# Patient Record
Sex: FEMALE | Race: WHITE | Employment: UNEMPLOYED | URBAN - METROPOLITAN AREA
[De-identification: names, ages, dates, MRNs, and addresses within clinical notes are randomized per-mention and may not be internally consistent; named-entity substitution may affect disease eponyms.]

---
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
## 2024-02-05 ENCOUNTER — HOSPITAL ENCOUNTER (EMERGENCY)
Age: 19
Discharge: HOME | End: 2024-02-05
Attending: EMERGENCY MEDICINE
Payer: COMMERCIAL

## 2024-02-05 ENCOUNTER — ANCILLARY PROCEDURE (OUTPATIENT)
Dept: EMERGENCY DEPT | Age: 19
End: 2024-02-05
Attending: EMERGENCY MEDICINE
Payer: COMMERCIAL

## 2024-02-05 VITALS
SYSTOLIC BLOOD PRESSURE: 117 MMHG | HEIGHT: 62 IN | RESPIRATION RATE: 16 BRPM | TEMPERATURE: 98.1 F | DIASTOLIC BLOOD PRESSURE: 64 MMHG | BODY MASS INDEX: 19.14 KG/M2 | WEIGHT: 104 LBS | OXYGEN SATURATION: 98 % | HEART RATE: 82 BPM

## 2024-02-05 DIAGNOSIS — O20.0 THREATENED MISCARRIAGE: Primary | ICD-10-CM

## 2024-02-05 LAB
ANION GAP SERPL CALCULATED.3IONS-SCNC: 10 MMOL/L (ref 9–17)
B-HCG SERPL EIA 3RD IS-ACNC: 1809 MIU/ML
BASOPHILS # BLD: <0.03 K/UL (ref 0–0.2)
BASOPHILS NFR BLD: 0 % (ref 0–2)
BUN SERPL-MCNC: 9 MG/DL (ref 6–20)
BUN/CREAT SERPL: 18 (ref 9–20)
CALCIUM SERPL-MCNC: 8.9 MG/DL (ref 8.6–10.4)
CHLORIDE SERPL-SCNC: 106 MMOL/L (ref 98–107)
CO2 SERPL-SCNC: 21 MMOL/L (ref 20–31)
CREAT SERPL-MCNC: 0.5 MG/DL (ref 0.5–0.9)
EOSINOPHIL # BLD: 0.06 K/UL (ref 0–0.44)
EOSINOPHILS RELATIVE PERCENT: 1 % (ref 1–4)
ERYTHROCYTE [DISTWIDTH] IN BLOOD BY AUTOMATED COUNT: 12.7 % (ref 11.8–14.4)
GFR SERPL CREATININE-BSD FRML MDRD: >60 ML/MIN/1.73M2
GLUCOSE SERPL-MCNC: 86 MG/DL (ref 70–99)
HCT VFR BLD AUTO: 35.9 % (ref 36.3–47.1)
HGB BLD-MCNC: 11.9 G/DL (ref 11.9–15.1)
IMM GRANULOCYTES # BLD AUTO: 0.01 K/UL (ref 0–0.3)
IMM GRANULOCYTES NFR BLD: 0 %
LYMPHOCYTES NFR BLD: 1.39 K/UL (ref 1.2–5.2)
LYMPHOCYTES RELATIVE PERCENT: 30 % (ref 25–45)
MCH RBC QN AUTO: 30.1 PG (ref 25.2–33.5)
MCHC RBC AUTO-ENTMCNC: 33.1 G/DL (ref 28.4–34.8)
MCV RBC AUTO: 90.9 FL (ref 82.6–102.9)
MONOCYTES NFR BLD: 0.36 K/UL (ref 0.1–1.4)
MONOCYTES NFR BLD: 8 % (ref 2–8)
NEUTROPHILS NFR BLD: 61 % (ref 34–64)
NEUTS SEG NFR BLD: 2.77 K/UL (ref 1.8–8)
NRBC BLD-RTO: 0 PER 100 WBC
PLATELET # BLD AUTO: 242 K/UL (ref 138–453)
PMV BLD AUTO: 9.5 FL (ref 8.1–13.5)
POTASSIUM SERPL-SCNC: 3.7 MMOL/L (ref 3.7–5.3)
RBC # BLD AUTO: 3.95 M/UL (ref 3.95–5.11)
SODIUM SERPL-SCNC: 137 MMOL/L (ref 135–144)
WBC OTHER # BLD: 4.6 K/UL (ref 4.5–13.5)

## 2024-02-05 PROCEDURE — 80048 BASIC METABOLIC PNL TOTAL CA: CPT

## 2024-02-05 PROCEDURE — 85025 COMPLETE CBC W/AUTO DIFF WBC: CPT

## 2024-02-05 PROCEDURE — 99283 EMERGENCY DEPT VISIT LOW MDM: CPT

## 2024-02-05 PROCEDURE — 76705 ECHO EXAM OF ABDOMEN: CPT

## 2024-02-05 PROCEDURE — 84702 CHORIONIC GONADOTROPIN TEST: CPT

## 2024-02-05 NOTE — DISCHARGE INSTRUCTIONS
Given your hCG levels are going down this is likely indicating that you are going to miscarry.  Increased bleeding and cramping are expected.  You can take Motrin and or Tylenol to help with discomfort.  You may return the emergency room at any time if you feel necessary.  If you have heavy bleeding where you are saturating a pad or tampon every hour for more than 3 hours I do recommend return to the ER.

## 2024-02-05 NOTE — ED PROVIDER NOTES
EMERGENCY DEPARTMENT ENCOUNTER    Pt Name: Jaki Waters  MRN: [id number]  Birthdate 2005  Date of evaluation: 2/5/24  CHIEF COMPLAINT       Chief Complaint   Patient presents with    Vaginal Bleeding     HISTORY OF PRESENT ILLNESS   18-year-old female presents emergency room with vaginal bleeding.  Patient is currently 6 weeks pregnant.  Patient had transvaginal ultrasound at [facility] on 2/2/2024.  She had a little bit of brown spotting then has had increased bleeding since.             REVIEW OF SYSTEMS     Review of Systems  PASTMEDICAL HISTORY   No past medical history on file.  Past Problem List  Patient Active Problem List   Diagnosis Code    Abscess L02.91    Neck abscess L02.11    Scoliosis of thoracolumbar spine M41.9    Tobacco abuse Z72.0    High anion gap metabolic acidosis E87.29    Anorexia R63.0    Atypical chest pain R07.89     SURGICAL HISTORY       Past Surgical History:   Procedure Laterality Date    INCISION AND DRAINAGE ANTERIOR NECK Left 10/05/2022    NECK INCISION AND DRAINAGE- LEFT    NECK SURGERY Left 10/5/2022    NECK INCISION AND DRAINAGE- LEFT performed by Reed Weir MD at [facility] OR     CURRENT MEDICATIONS       Previous Medications    No medications on file     ALLERGIES     has No Known Allergies.  FAMILY HISTORY     She indicated that the status of her mother is unknown. She indicated that the status of her father is unknown.     SOCIAL HISTORY       Social History     Tobacco Use    Smoking status: Never    Smokeless tobacco: Never   Vaping Use    Vaping Use: Every day    Substances: Nicotine, CBD, Flavoring   Substance Use Topics    Alcohol use: Yes     Alcohol/week: 2.0 standard drinks of alcohol     Types: 2 Drinks containing 0.5 oz of alcohol per week    Drug use: Never     PHYSICAL EXAM     INITIAL VITALS: /64   Pulse 82   Temp 98.1 °F (36.7 °C) (Oral)   Resp 16   Ht 1.575 m (5' 2\")   Wt 47.2 kg (104 lb)   LMP 12/22/2023   SpO2 98%   BMI 19.02

## 2024-04-15 ENCOUNTER — HOSPITAL ENCOUNTER (EMERGENCY)
Age: 19
Discharge: HOME | End: 2024-04-15
Attending: EMERGENCY MEDICINE
Payer: COMMERCIAL

## 2024-04-15 VITALS
WEIGHT: 110 LBS | OXYGEN SATURATION: 98 % | BODY MASS INDEX: 20.24 KG/M2 | TEMPERATURE: 98.3 F | HEART RATE: 65 BPM | DIASTOLIC BLOOD PRESSURE: 64 MMHG | SYSTOLIC BLOOD PRESSURE: 98 MMHG | RESPIRATION RATE: 20 BRPM | HEIGHT: 62 IN

## 2024-04-15 DIAGNOSIS — R30.0 DYSURIA: Primary | ICD-10-CM

## 2024-04-15 LAB
BILIRUB UR QL STRIP: NEGATIVE
CLARITY UR: CLEAR
COLOR UR: YELLOW
EPI CELLS #/AREA URNS HPF: NORMAL /HPF (ref 0–5)
GLUCOSE UR STRIP-MCNC: NEGATIVE MG/DL
HCG UR QL: NEGATIVE
HGB UR QL STRIP.AUTO: NEGATIVE
KETONES UR STRIP-MCNC: NEGATIVE MG/DL
LEUKOCYTE ESTERASE UR QL STRIP: ABNORMAL
NITRITE UR QL STRIP: NEGATIVE
PH UR STRIP: 7 [PH] (ref 5–8)
PROT UR STRIP-MCNC: NEGATIVE MG/DL
RBC #/AREA URNS HPF: NORMAL /HPF (ref 0–2)
SP GR UR STRIP: <1.005 (ref 1–1.03)
UROBILINOGEN UR STRIP-ACNC: NORMAL EU/DL (ref 0–1)
WBC #/AREA URNS HPF: NORMAL /HPF (ref 0–5)

## 2024-04-15 PROCEDURE — 81001 URINALYSIS AUTO W/SCOPE: CPT

## 2024-04-15 PROCEDURE — 87491 CHLMYD TRACH DNA AMP PROBE: CPT

## 2024-04-15 PROCEDURE — 81025 URINE PREGNANCY TEST: CPT

## 2024-04-15 PROCEDURE — 99283 EMERGENCY DEPT VISIT LOW MDM: CPT

## 2024-04-15 PROCEDURE — 87591 N.GONORRHOEAE DNA AMP PROB: CPT

## 2024-04-15 ASSESSMENT — ENCOUNTER SYMPTOMS
EYE ITCHING: 0
NAUSEA: 0
EYE DISCHARGE: 0
COLOR CHANGE: 0
BACK PAIN: 0
VOMITING: 0
EYE PAIN: 0
WHEEZING: 0
RHINORRHEA: 0
SORE THROAT: 0
COUGH: 0

## 2024-04-15 ASSESSMENT — PAIN - FUNCTIONAL ASSESSMENT: PAIN_FUNCTIONAL_ASSESSMENT: NONE - DENIES PAIN

## 2024-04-15 NOTE — ED NOTES
Pt provided discharge instructions and told that she will be called with abnormal urine findings.  Pt instructed to follow up with PCP and told to return to ED with new / worsened symptoms.  Pt verbalizes understanding and denies additional questions or concerns at this time.  Pt ambulatory out of ED with steady gait

## 2024-04-15 NOTE — DISCHARGE INSTRUCTIONS
The pharmacist or ED staff will call if your culture is positive    Please return to the emergency department for worsening symptoms or other emergent concerns

## 2024-04-15 NOTE — ED NOTES
Pt to ED with concern for UTI.  Pt reports increased urinary frequency, urgency, and burning with urination for the past couple of days.  Pt denies abdominal pain, nausea, vomiting, fever, or chills.  Pt denies hematuria

## 2024-04-16 LAB
CHLAMYDIA DNA UR QL NAA+PROBE: NEGATIVE
N GONORRHOEA DNA UR QL NAA+PROBE: NEGATIVE
SPECIMEN DESCRIPTION: NORMAL

## 2024-04-16 NOTE — ED PROVIDER NOTES
eMERGENCY dEPARTMENT eNCOUnter   Independent Attestation     Pt Name: Jaki Waters  MRN: [id number]  Birthdate 2005  Date of evaluation: 4/16/24     Jaki Waters is a 18 y.o. female with CC: Urinary Frequency (Onset yesterday)      Based on the medical record the care appears appropriate.  I was personally available for consultation in the Emergency Department.    Sally Arrington MD  Attending Emergency Physician                  Sally Arrington MD  04/16/24 0711    
MD Carm[facility] at [facility] OR     CURRENT MEDICATIONS       There are no discharge medications for this patient.    ALLERGIES     has No Known Allergies.  FAMILY HISTORY     She indicated that the status of her mother is unknown. She indicated that the status of her father is unknown.     SOCIAL HISTORY       Social History     Tobacco Use    Smoking status: Never    Smokeless tobacco: Never   Vaping Use    Vaping Use: Every day    Substances: Nicotine, CBD, Flavoring   Substance Use Topics    Alcohol use: Yes     Alcohol/week: 2.0 standard drinks of alcohol     Types: 2 Drinks containing 0.5 oz of alcohol per week    Drug use: Never     PHYSICAL EXAM     INITIAL VITALS: BP 98/64   Pulse 65   Temp 98.3 °F (36.8 °C) (Oral)   Resp 20   Ht 1.575 m (5' 2\")   Wt 49.9 kg (110 lb)   LMP 04/01/2024   SpO2 98%   BMI 20.12 kg/m²    Physical Exam  Constitutional:       Appearance: She is well-developed. She is not diaphoretic.   HENT:      Head: Normocephalic and atraumatic.      Right Ear: External ear normal.      Left Ear: External ear normal.   Eyes:      General: No scleral icterus.        Right eye: No discharge.         Left eye: No discharge.   Neck:      Trachea: No tracheal deviation.   Cardiovascular:      Rate and Rhythm: Normal rate and regular rhythm.      Heart sounds: Normal heart sounds. No murmur heard.     No gallop.   Pulmonary:      Effort: Pulmonary effort is normal. No respiratory distress.      Breath sounds: Normal breath sounds. No stridor.   Abdominal:      Tenderness: There is no abdominal tenderness. There is no guarding or rebound.   Musculoskeletal:         General: Normal range of motion.      Cervical back: Normal range of motion.   Skin:     General: Skin is warm and dry.      Coloration: Skin is not pale.      Findings: No rash (on exposed surfaces).   Neurological:      Mental Status: She is alert and oriented to person, place, and time.      Coordination: Coordination normal.

## 2024-06-10 ENCOUNTER — OFFICE VISIT (OUTPATIENT)
Dept: OBGYN CLINIC | Age: 19
End: 2024-06-10
Payer: COMMERCIAL

## 2024-06-10 ENCOUNTER — HOSPITAL ENCOUNTER (OUTPATIENT)
Age: 19
Setting detail: SPECIMEN
Discharge: HOME | End: 2024-06-10

## 2024-06-10 VITALS
WEIGHT: 105 LBS | DIASTOLIC BLOOD PRESSURE: 75 MMHG | BODY MASS INDEX: 19.32 KG/M2 | HEIGHT: 62 IN | SYSTOLIC BLOOD PRESSURE: 110 MMHG

## 2024-06-10 DIAGNOSIS — Z32.01 POSITIVE PREGNANCY TEST: Primary | ICD-10-CM

## 2024-06-10 DIAGNOSIS — Z3A.10 10 WEEKS GESTATION OF PREGNANCY: ICD-10-CM

## 2024-06-10 DIAGNOSIS — O21.9 NAUSEA AND VOMITING DURING PREGNANCY PRIOR TO 22 WEEKS GESTATION: ICD-10-CM

## 2024-06-10 DIAGNOSIS — Z32.01 POSITIVE PREGNANCY TEST: ICD-10-CM

## 2024-06-10 LAB
ABO + RH BLD: NORMAL
BASOPHILS # BLD: <0.03 K/UL (ref 0–0.2)
BASOPHILS NFR BLD: 0 % (ref 0–2)
BLOOD GROUP ANTIBODIES SERPL: NEGATIVE
EOSINOPHIL # BLD: 0.04 K/UL (ref 0–0.44)
ERYTHROCYTE [DISTWIDTH] IN BLOOD BY AUTOMATED COUNT: 12.8 % (ref 11.8–14.4)
HBV SURFACE AG SERPL QL IA: NONREACTIVE
HCT VFR BLD AUTO: 39.5 % (ref 36.3–47.1)
HCV AB SERPL QL IA: NONREACTIVE
HGB BLD-MCNC: 13.2 G/DL (ref 11.9–15.1)
HIV 1+2 AB+HIV1 P24 AG SERPL QL IA: NONREACTIVE
IMM GRANULOCYTES # BLD AUTO: <0.03 K/UL (ref 0–0.3)
IMM GRANULOCYTES NFR BLD: 0 %
LYMPHOCYTES NFR BLD: 1.15 K/UL (ref 1.2–5.2)
LYMPHOCYTES RELATIVE PERCENT: 17 % (ref 25–45)
MCH RBC QN AUTO: 30.8 PG (ref 25.2–33.5)
MCHC RBC AUTO-ENTMCNC: 33.4 G/DL (ref 28.4–34.8)
MCV RBC AUTO: 92.1 FL (ref 82.6–102.9)
MONOCYTES NFR BLD: 0.34 K/UL (ref 0.1–1.4)
MONOCYTES NFR BLD: 5 % (ref 2–8)
NEUTROPHILS NFR BLD: 77 % (ref 34–64)
NEUTS SEG NFR BLD: 5.23 K/UL (ref 1.8–8)
NRBC BLD-RTO: 0 PER 100 WBC
PLATELET # BLD AUTO: 278 K/UL (ref 138–453)
PMV BLD AUTO: 10.6 FL (ref 8.1–13.5)
RBC # BLD AUTO: 4.29 M/UL (ref 3.95–5.11)
RUBV IGG SERPL QL IA: 286 IU/ML
T PALLIDUM AB SER QL IA: NONREACTIVE
WBC OTHER # BLD: 6.8 K/UL (ref 4.5–13.5)

## 2024-06-10 PROCEDURE — 99213 OFFICE O/P EST LOW 20 MIN: CPT | Performed by: NURSE PRACTITIONER

## 2024-06-10 RX ORDER — PNV NO.95/FERROUS FUM/FOLIC AC 28MG-0.8MG
1 TABLET ORAL DAILY
Qty: 30 TABLET | Refills: 12 | Status: SHIPPED | OUTPATIENT
Start: 2024-06-10

## 2024-06-10 RX ORDER — PROMETHAZINE HYDROCHLORIDE 12.5 MG/1
12.5 TABLET ORAL EVERY 6 HOURS PRN
Qty: 30 TABLET | Refills: 1 | Status: SHIPPED | OUTPATIENT
Start: 2024-06-10

## 2024-06-10 NOTE — PROGRESS NOTES
Prenatal Visit    Jaki Waters  6/10/2024   Patient's last menstrual period was 2024.        CC: Initial Prenatal Visit    Subjective:     Jaki Waters is being seen today for her first obstetrical visit.  This is not a planned pregnancy. She is a  at 10w5d by ultrasound and 9w3d by LMP  Her obstetrical history is significant for a SAB in February.     Relationship with FOB: significant other, living together. Patient does not intend to breast feed. Pregnancy history fully reviewed.  Mother's ethnicity:   Father's ethnicity:      She is having a lot of nausea and vomiting.  Some days she throws up a lot but then other days just once or twice.  She feels like she is keeping food and fluids down for the most part      Objective:     Vitals:    06/10/24 1039   BP: 110/75   Site: Left Upper Arm   Position: Sitting   Cuff Size: Medium Adult   Weight: 47.6 kg (105 lb)   Height: 1.575 m (5' 2\")         History reviewed. No pertinent past medical history.  Past Surgical History:   Procedure Laterality Date    INCISION AND DRAINAGE ANTERIOR NECK Left 10/05/2022    NECK INCISION AND DRAINAGE- LEFT    NECK SURGERY Left 10/5/2022    NECK INCISION AND DRAINAGE- LEFT performed by Reed Weir MD at [facility] OR     Social History     Tobacco Use   Smoking Status Never   Smokeless Tobacco Never     Social History     Substance and Sexual Activity   Alcohol Use Not Currently    Alcohol/week: 2.0 standard drinks of alcohol    Types: 2 Drinks containing 0.5 oz of alcohol per week     Current Outpatient Medications   Medication Sig Dispense Refill    Prenatal Vit-Fe Fumarate-FA (PRENATAL VITAMIN) 27-0.8 MG TABS Take 1 tablet by mouth daily 30 tablet 12    promethazine (PHENERGAN) 12.5 MG tablet Take 1 tablet by mouth every 6 hours as needed for Nausea 30 tablet 1     No current facility-administered medications for this visit.       ALLERGIES:  Patient has no known allergies.    General

## 2024-06-11 LAB
AMPHET UR QL SCN: NEGATIVE
BACTERIA URNS QL MICRO: ABNORMAL
BARBITURATES UR QL SCN: NEGATIVE
BENZODIAZ UR QL: NEGATIVE
BILIRUB UR QL STRIP: NEGATIVE
C TRACH DNA SPEC QL PROBE+SIG AMP: NEGATIVE
CANDIDA SPECIES: NEGATIVE
CANNABINOIDS UR QL SCN: NEGATIVE
CASTS #/AREA URNS LPF: ABNORMAL /LPF (ref 0–8)
CLARITY UR: CLEAR
COCAINE UR QL SCN: NEGATIVE
COLOR UR: YELLOW
EPI CELLS #/AREA URNS HPF: ABNORMAL /HPF (ref 0–5)
FENTANYL UR QL: NEGATIVE
GARDNERELLA VAGINALIS: NEGATIVE
GLUCOSE UR STRIP-MCNC: NEGATIVE MG/DL
HGB UR QL STRIP.AUTO: NEGATIVE
KETONES UR STRIP-MCNC: NEGATIVE MG/DL
LEUKOCYTE ESTERASE UR QL STRIP: ABNORMAL
METHADONE UR QL: NEGATIVE
MICROORGANISM SPEC CULT: NORMAL
N GONORRHOEA DNA SPEC QL PROBE+SIG AMP: NEGATIVE
NITRITE UR QL STRIP: POSITIVE
OPIATES UR QL SCN: NEGATIVE
OXYCODONE UR QL SCN: NEGATIVE
PCP UR QL SCN: NEGATIVE
PH UR STRIP: 7 [PH] (ref 5–8)
PROT UR STRIP-MCNC: NEGATIVE MG/DL
RBC #/AREA URNS HPF: ABNORMAL /HPF (ref 0–4)
SERVICE CMNT-IMP: NORMAL
SOURCE: NORMAL
SP GR UR STRIP: 1.01 (ref 1–1.03)
SPECIMEN DESCRIPTION: NORMAL
SPECIMEN DESCRIPTION: NORMAL
TEST INFORMATION: NORMAL
TRICHOMONAS: NEGATIVE
UROBILINOGEN UR STRIP-ACNC: NORMAL EU/DL (ref 0–1)
WBC #/AREA URNS HPF: ABNORMAL /HPF (ref 0–5)

## 2024-06-16 LAB
CFTR ALLELE 1 BLD/T QL: NEGATIVE
CFTR ALLELE 2 BLD/T QL: NEGATIVE
CFTR MUT ANL BLD/T: NORMAL
CFTR MUT ANL BLD/T: NORMAL

## 2024-06-19 LAB
Lab: NORMAL
NTRA FETAL FRACTION: NORMAL
NTRA GENDER OF FETUS: NORMAL
NTRA MONOSOMY X AGE-BASED RISK TEXT: NORMAL
NTRA MONOSOMY X RESULT TEXT: NORMAL
NTRA MONOSOMY X RISK SCORE TEXT: NORMAL
NTRA TRIPLOIDY RESULT TEXT: NORMAL
NTRA TRISOMY 13 AGE-BASED RISK TEXT: NORMAL
NTRA TRISOMY 13 RESULT TEXT: NORMAL
NTRA TRISOMY 13 RISK SCORE TEXT: NORMAL
NTRA TRISOMY 18 AGE-BASED RISK TEXT: NORMAL
NTRA TRISOMY 18 RESULT TEXT: NORMAL
NTRA TRISOMY 18 RISK SCORE TEXT: NORMAL
NTRA TRISOMY 21 AGE-BASED RISK TEXT: NORMAL
NTRA TRISOMY 21 RESULT TEXT: NORMAL
NTRA TRISOMY 21 RISK SCORE TEXT: NORMAL

## 2024-06-26 ENCOUNTER — INITIAL PRENATAL (OUTPATIENT)
Dept: OBGYN CLINIC | Age: 19
End: 2024-06-26
Payer: COMMERCIAL

## 2024-06-26 VITALS
DIASTOLIC BLOOD PRESSURE: 68 MMHG | HEIGHT: 62 IN | SYSTOLIC BLOOD PRESSURE: 102 MMHG | BODY MASS INDEX: 19.91 KG/M2 | WEIGHT: 108.2 LBS

## 2024-06-26 DIAGNOSIS — R51.9 PREGNANCY HEADACHE IN FIRST TRIMESTER: ICD-10-CM

## 2024-06-26 DIAGNOSIS — R51.9 PREGNANCY HEADACHE IN SECOND TRIMESTER: ICD-10-CM

## 2024-06-26 DIAGNOSIS — Z3A.13 13 WEEKS GESTATION OF PREGNANCY: Primary | ICD-10-CM

## 2024-06-26 DIAGNOSIS — O21.9 NAUSEA AND VOMITING DURING PREGNANCY: ICD-10-CM

## 2024-06-26 DIAGNOSIS — R63.0 ANOREXIA: ICD-10-CM

## 2024-06-26 DIAGNOSIS — O26.891 PREGNANCY HEADACHE IN FIRST TRIMESTER: ICD-10-CM

## 2024-06-26 DIAGNOSIS — O26.892 PREGNANCY HEADACHE IN SECOND TRIMESTER: ICD-10-CM

## 2024-06-26 DIAGNOSIS — M41.85 OTHER FORM OF SCOLIOSIS OF THORACOLUMBAR SPINE: ICD-10-CM

## 2024-06-26 DIAGNOSIS — Z72.0 TOBACCO ABUSE: ICD-10-CM

## 2024-06-26 PROCEDURE — 99211 OFF/OP EST MAY X REQ PHY/QHP: CPT | Performed by: STUDENT IN AN ORGANIZED HEALTH CARE EDUCATION/TRAINING PROGRAM

## 2024-06-26 RX ORDER — CALCIUM CARBONATE/VITAMIN D3 500-10/5ML
400 LIQUID (ML) ORAL NIGHTLY
Qty: 90 CAPSULE | Refills: 3 | Status: SHIPPED | OUTPATIENT
Start: 2024-06-26 | End: 2024-09-24

## 2024-06-26 RX ORDER — ONDANSETRON 4 MG/1
4 TABLET, ORALLY DISINTEGRATING ORAL EVERY 8 HOURS PRN
Qty: 30 TABLET | Refills: 0 | Status: SHIPPED | OUTPATIENT
Start: 2024-06-26 | End: 2024-07-26

## 2024-06-26 NOTE — PROGRESS NOTES
Relationship with FOB: significant other, living together, 2nd pregnancy together, no other children  Partner's name:Tr  Plans to Breast fdg  Pain Score:6/10 H/A's last 2d constant  Job title:Manager at [employer]  This is a planned pregnancy:not preventing  Certain LMP:Yes  S/S of pregnancy:Yes, missed menses  Hx N/V pregnancy:N/V with little relief with phenergan and failed on B6 Unisom (with last preg). Pt going to try Zofran.        Mother's ethnicity:    Father's ethnicity:         Patient Active Problem List   Diagnosis    Abscess    Neck abscess    Scoliosis of thoracolumbar spine    Tobacco abuse    High anion gap metabolic acidosis    Anorexia    Atypical chest pain     Blood pressure 102/68, height 1.575 m (5' 2\"), weight 49.1 kg (108 lb 3.2 oz), last menstrual period 2024.      Jaki Waters is a 19 y.o. , here for her ACOG. The patients past medical, surgical, social and family history were reviewed.  Current medications and allergies were reviewed, and documented in the chart.      Menstrual history: regular  Birth control: BCP.     Wt Readings from Last 3 Encounters:   24 49.1 kg (108 lb 3.2 oz)   06/10/24 47.6 kg (105 lb)   04/15/24 49.9 kg (110 lb) (15 %, Z= -1.04)*     * Growth percentiles are based on CDC (Girls, 2-20 Years) data.     Recent Results (from the past 8736 hour(s))   HCG, Total, Quant    Collection Time: 24 11:00 AM   Result Value Ref Range    hCG Quant 1,809.0 (H) <5 mIU/mL   CBC with Auto Differential    Collection Time: 24 11:00 AM   Result Value Ref Range    WBC 4.6 4.5 - 13.5 k/uL    RBC 3.95 3.95 - 5.11 m/uL    Hemoglobin 11.9 11.9 - 15.1 g/dL    Hematocrit 35.9 (L) 36.3 - 47.1 %    MCV 90.9 82.6 - 102.9 fL    MCH 30.1 25.2 - 33.5 pg    MCHC 33.1 28.4 - 34.8 g/dL    RDW 12.7 11.8 - 14.4 %    Platelets 242 138 - 453 k/uL    MPV 9.5 8.1 - 13.5 fL    NRBC Automated 0.0 0.0 per 100 WBC    Neutrophils % 61 34 - 64 %

## 2024-07-10 ENCOUNTER — HOSPITAL ENCOUNTER (OUTPATIENT)
Age: 19
Discharge: HOME | End: 2024-07-10
Payer: COMMERCIAL

## 2024-07-10 ENCOUNTER — ROUTINE PRENATAL (OUTPATIENT)
Dept: PERINATAL CARE | Age: 19
End: 2024-07-10
Payer: COMMERCIAL

## 2024-07-10 VITALS
HEIGHT: 62 IN | WEIGHT: 109.13 LBS | BODY MASS INDEX: 20.08 KG/M2 | DIASTOLIC BLOOD PRESSURE: 62 MMHG | SYSTOLIC BLOOD PRESSURE: 110 MMHG | HEART RATE: 74 BPM

## 2024-07-10 DIAGNOSIS — O99.891 CURRENT MATERNAL CONDITION AFFECTING PREGNANCY: ICD-10-CM

## 2024-07-10 DIAGNOSIS — O26.12 LOW WEIGHT GAIN DURING PREGNANCY IN SECOND TRIMESTER: Primary | ICD-10-CM

## 2024-07-10 DIAGNOSIS — Z3A.15 15 WEEKS GESTATION OF PREGNANCY: ICD-10-CM

## 2024-07-10 PROCEDURE — 82105 ALPHA-FETOPROTEIN SERUM: CPT

## 2024-07-10 PROCEDURE — 99243 OFF/OP CNSLTJ NEW/EST LOW 30: CPT | Performed by: OBSTETRICS & GYNECOLOGY

## 2024-07-13 LAB
AFP INTERPRETATION: NORMAL
AFP MOM: 0.56
AFP SPECIMEN: NORMAL
AFP: 20 NG/ML
DATE OF BIRTH: NORMAL
DATING METHOD: NORMAL
DETERMINED BY: NORMAL
DIABETIC: NEGATIVE
DONOR EGG?: NORMAL
DUE DATE: NORMAL
ESTIMATED DUE DATE: NORMAL
FAMILY HISTORY NTD: NEGATIVE
GESTATIONAL AGE: NORMAL
IN VITRO FERTILIZATION: NORMAL
INSULIN REQ DIABETES: NO
LAST MENSTRUAL PERIOD: NORMAL
MATERNAL AGE AT EDD: 20.7 YR
MATERNAL WEIGHT: 109
MONOCHORIONIC TWINS: NEGATIVE
NUMBER OF FETUSES: NORMAL
PATIENT WEIGHT UNITS: NORMAL
PATIENT WEIGHT: NORMAL
RACE (MATERNAL): NORMAL
RACE: NORMAL
REPEAT SPECIMEN?: NEGATIVE
SMOKING: NORMAL
SMOKING: NORMAL
VALPROIC/CARBAMAZEP: NORMAL
ZZ NTE CLEAN UP: HISTORY: NO

## 2024-07-17 ENCOUNTER — ROUTINE PRENATAL (OUTPATIENT)
Dept: OBGYN CLINIC | Age: 19
End: 2024-07-17
Payer: COMMERCIAL

## 2024-07-17 VITALS
SYSTOLIC BLOOD PRESSURE: 117 MMHG | WEIGHT: 108 LBS | BODY MASS INDEX: 19.75 KG/M2 | HEART RATE: 93 BPM | DIASTOLIC BLOOD PRESSURE: 74 MMHG

## 2024-07-17 DIAGNOSIS — Z34.92 NORMAL PREGNANCY IN SECOND TRIMESTER: ICD-10-CM

## 2024-07-17 DIAGNOSIS — Z3A.16 16 WEEKS GESTATION OF PREGNANCY: Primary | ICD-10-CM

## 2024-07-17 PROBLEM — Z72.0 TOBACCO ABUSE: Status: RESOLVED | Noted: 2022-10-05 | Resolved: 2024-07-17

## 2024-07-17 PROCEDURE — 99212 OFFICE O/P EST SF 10 MIN: CPT | Performed by: OBSTETRICS & GYNECOLOGY

## 2024-07-17 NOTE — PROGRESS NOTES
Jaki is a  @ 16w0d who presents for MARCELA visit.  She denies LOF, VB or Ctxs.  - FM.  She had a lot of morning sickness, but it is improved through the rest of the day.    She denies any fevers/chills, SOB, cough, sore throat, loss of taste/smell or sick contacts.  Pt denies any HA, vision changes or RUQ pain.     O:  Vitals:    24 1348   BP: 117/74   Pulse: 93     Gen: NAD  Abd: soft, nontender, gravid  Ext:  no edema      BP: 117/74  Weight - Scale: 49 kg (108 lb)  Pulse: 93  Patient Position: Sitting  Fetal HR: 150  Movement: Absent    A/P:  Patient Active Problem List    Diagnosis Date Noted    Abscess 10/05/2022     Priority: Medium    Neck abscess 10/05/2022     Priority: Medium    Scoliosis of thoracolumbar spine 10/05/2022     Priority: Medium    High anion gap metabolic acidosis 10/05/2022     Priority: Medium    Anorexia 10/05/2022     Priority: Medium    Atypical chest pain 10/05/2022     Priority: Medium     - Discussed updated COVID precautions and policies. Reviewed updated visitor policy. Encouraged social distancing and appropriate hand washing/hygiene practices. Reviewed symptoms suspicious for COVID infection. Discussed that ACOG, SMFM, and the CDC recommend to not withold immunization in pregnant and breastfeeding women who meet criteria for receipt of the vaccine based on the ACIP recommended priority groups. All questions answered. Patient vocalized understanding.    - RSV vaccination (32-36 weeks): The patient was counseled on benefits to her baby if she receives the Pfizer RSV vaccine (Abrysvo) during pregnancy. She was informed that this vaccination is FDA approved for use during pregnancy. She will think about it and call local pharmacies       Discussed s/sx that should prompt call to the office  Discussed kick counts  Pt counseled to continue PNVs  RTC in 4 wks    Cheryl Martinez MD

## 2024-07-22 DIAGNOSIS — Z32.01 POSITIVE PREGNANCY TEST: ICD-10-CM

## 2024-07-22 DIAGNOSIS — Z3A.10 10 WEEKS GESTATION OF PREGNANCY: ICD-10-CM

## 2024-07-22 RX ORDER — PNV NO.95/FERROUS FUM/FOLIC AC 28MG-0.8MG
1 TABLET ORAL DAILY
Qty: 30 TABLET | Refills: 12 | Status: SHIPPED | OUTPATIENT
Start: 2024-07-22

## 2024-07-22 NOTE — TELEPHONE ENCOUNTER
16.5wks    Needing refills sent to a [facility]FFER[facility]

## 2024-07-23 ENCOUNTER — TELEPHONE (OUTPATIENT)
Dept: OBGYN CLINIC | Age: 19
End: 2024-07-23

## 2024-07-23 ENCOUNTER — HOSPITAL ENCOUNTER (EMERGENCY)
Facility: CLINIC | Age: 19
Discharge: HOME | End: 2024-07-23
Attending: EMERGENCY MEDICINE
Payer: COMMERCIAL

## 2024-07-23 ENCOUNTER — APPOINTMENT (OUTPATIENT)
Dept: ULTRASOUND IMAGING | Facility: CLINIC | Age: 19
End: 2024-07-23
Payer: COMMERCIAL

## 2024-07-23 VITALS
TEMPERATURE: 97.9 F | DIASTOLIC BLOOD PRESSURE: 68 MMHG | HEART RATE: 74 BPM | BODY MASS INDEX: 20.24 KG/M2 | HEIGHT: 62 IN | SYSTOLIC BLOOD PRESSURE: 119 MMHG | OXYGEN SATURATION: 100 % | RESPIRATION RATE: 18 BRPM | WEIGHT: 110 LBS

## 2024-07-23 DIAGNOSIS — R10.11 RIGHT UPPER QUADRANT ABDOMINAL PAIN: Primary | ICD-10-CM

## 2024-07-23 LAB
ALBUMIN SERPL-MCNC: 4.2 G/DL (ref 3.5–5.2)
ALBUMIN/GLOB SERPL: 1.4 {RATIO} (ref 1–2.5)
ALP SERPL-CCNC: 50 U/L (ref 35–104)
ALT SERPL-CCNC: 9 U/L (ref 5–33)
AMYLASE SERPL-CCNC: 75 U/L (ref 28–100)
ANION GAP SERPL CALCULATED.3IONS-SCNC: 12 MMOL/L (ref 9–17)
AST SERPL-CCNC: 15 U/L
B-HCG SERPL EIA 3RD IS-ACNC: ABNORMAL MIU/ML
BASOPHILS # BLD: 0 K/UL (ref 0–0.2)
BASOPHILS NFR BLD: 0 % (ref 0–2)
BILIRUB DIRECT SERPL-MCNC: 0.1 MG/DL
BILIRUB INDIRECT SERPL-MCNC: 0.2 MG/DL (ref 0–1)
BILIRUB SERPL-MCNC: 0.3 MG/DL (ref 0.3–1.2)
BILIRUB UR QL STRIP: NEGATIVE
BUN SERPL-MCNC: 6 MG/DL (ref 6–20)
CALCIUM SERPL-MCNC: 9.4 MG/DL (ref 8.6–10.4)
CHLORIDE SERPL-SCNC: 105 MMOL/L (ref 98–107)
CLARITY UR: CLEAR
CO2 SERPL-SCNC: 21 MMOL/L (ref 20–31)
COLOR UR: YELLOW
COMMENT: NORMAL
CREAT SERPL-MCNC: <0.4 MG/DL (ref 0.5–0.9)
EOSINOPHIL # BLD: 0.1 K/UL (ref 0–0.4)
EOSINOPHILS RELATIVE PERCENT: 1 % (ref 1–4)
ERYTHROCYTE [DISTWIDTH] IN BLOOD BY AUTOMATED COUNT: 13.8 % (ref 12.5–15.4)
GFR, ESTIMATED: ABNORMAL ML/MIN/1.73M2
GLUCOSE SERPL-MCNC: 85 MG/DL (ref 70–99)
GLUCOSE UR STRIP-MCNC: NEGATIVE MG/DL
HCT VFR BLD AUTO: 35.2 % (ref 36–46)
HGB BLD-MCNC: 12.2 G/DL (ref 12–16)
HGB UR QL STRIP.AUTO: NEGATIVE
KETONES UR STRIP-MCNC: NEGATIVE MG/DL
LEUKOCYTE ESTERASE UR QL STRIP: NEGATIVE
LIPASE SERPL-CCNC: 21 U/L (ref 13–60)
LYMPHOCYTES NFR BLD: 1.2 K/UL (ref 1.2–5.2)
LYMPHOCYTES RELATIVE PERCENT: 18 % (ref 25–45)
MCH RBC QN AUTO: 30.8 PG (ref 26–34)
MCHC RBC AUTO-ENTMCNC: 34.6 G/DL (ref 31–37)
MCV RBC AUTO: 88.8 FL (ref 80–100)
MONOCYTES NFR BLD: 0.3 K/UL (ref 0.1–1.4)
MONOCYTES NFR BLD: 5 % (ref 2–8)
NEUTROPHILS NFR BLD: 76 % (ref 34–64)
NEUTS SEG NFR BLD: 5.2 K/UL (ref 1.8–8)
NITRITE UR QL STRIP: NEGATIVE
PH UR STRIP: 6.5 [PH] (ref 5–8)
PLATELET # BLD AUTO: 229 K/UL (ref 140–450)
PMV BLD AUTO: 7.7 FL (ref 6–12)
POTASSIUM SERPL-SCNC: 3.3 MMOL/L (ref 3.7–5.3)
PROT SERPL-MCNC: 7.2 G/DL (ref 6.4–8.3)
PROT UR STRIP-MCNC: NEGATIVE MG/DL
RBC # BLD AUTO: 3.97 M/UL (ref 4–5.2)
SODIUM SERPL-SCNC: 138 MMOL/L (ref 135–144)
SP GR UR STRIP: 1.01 (ref 1–1.03)
UROBILINOGEN UR STRIP-ACNC: NORMAL EU/DL (ref 0–1)
WBC OTHER # BLD: 6.8 K/UL (ref 4.5–13.5)

## 2024-07-23 PROCEDURE — 84702 CHORIONIC GONADOTROPIN TEST: CPT

## 2024-07-23 PROCEDURE — 6370000000 HC RX 637 (ALT 250 FOR IP): Performed by: EMERGENCY MEDICINE

## 2024-07-23 PROCEDURE — 85025 COMPLETE CBC W/AUTO DIFF WBC: CPT

## 2024-07-23 PROCEDURE — 36415 COLL VENOUS BLD VENIPUNCTURE: CPT

## 2024-07-23 PROCEDURE — 82150 ASSAY OF AMYLASE: CPT

## 2024-07-23 PROCEDURE — 76705 ECHO EXAM OF ABDOMEN: CPT

## 2024-07-23 PROCEDURE — 99284 EMERGENCY DEPT VISIT MOD MDM: CPT

## 2024-07-23 PROCEDURE — 80076 HEPATIC FUNCTION PANEL: CPT

## 2024-07-23 PROCEDURE — 81003 URINALYSIS AUTO W/O SCOPE: CPT

## 2024-07-23 PROCEDURE — 80048 BASIC METABOLIC PNL TOTAL CA: CPT

## 2024-07-23 PROCEDURE — 83690 ASSAY OF LIPASE: CPT

## 2024-07-23 RX ORDER — ACETAMINOPHEN 500 MG
1000 TABLET ORAL ONCE
Status: COMPLETED | OUTPATIENT
Start: 2024-07-23 | End: 2024-07-23

## 2024-07-23 RX ADMIN — ACETAMINOPHEN 1000 MG: 500 TABLET ORAL at 11:12

## 2024-07-23 NOTE — DISCHARGE INSTR - COC
Schedule:  Phone:  Fax:    / signature: {Esignature:[id number]}    PHYSICIAN SECTION    Prognosis: {Prognosis:4000348951}    Condition at Discharge: { Patient Condition:353199157}    Rehab Potential (if transferring to Rehab): {Prognosis:6370802248}    Recommended Labs or Other Treatments After Discharge: ***    Physician Certification: I certify the above information and transfer of Jaki Waters  is necessary for the continuing treatment of the diagnosis listed and that she requires {Admit to Appropriate Level of Care:73079} for {GREATER/LESS:647028685} 30 days.     Update Admission H&P: {CHP DME Changes in HandP:255775500}    PHYSICIAN SIGNATURE:  {Esignature:[id number]}

## 2024-07-23 NOTE — TELEPHONE ENCOUNTER
LMP:04/05/24    16.6 wks    Last Seen:07/17/24    Next Appt: 08/14/24    C/O  RUQ Pain  Went to ER, had US done    Advised pt that concern could be digestive related and try taking tums, Pepcid to relieve pain, try soaking in warm bath, and increase hydration.   Call office with any changes and that if pain worsens she should go to . [facility] ER for evaluation.     Please Advise

## 2024-07-23 NOTE — ED PROVIDER NOTES
[facility]  [address] [address]  Phone: [phone]  EMERGENCY DEPARTMENT ENCOUNTER      Pt Name: Jaki Waters  MRN: [id number]  Birthdate 2005  Date of evaluation: 2024    CHIEF COMPLAINT       Chief Complaint   Patient presents with    Abdominal Cramping       HISTORY OF PRESENT ILLNESS    Jaki Waters is a 19 y.o. female who presents to the emergency department complaining of upper abdominal cramping and pain underneath her rib cage that started this morning when she woke up.  She is 16 weeks pregnant confirmed by ultrasound in .  G2, .  She denies any vaginal bleeding or discharge.  She said she woke up this morning feeling a little uneasy in her upper abdomen and had pain that was worse with standing causing her to double over.  She denies any pain currently.  No lower abdominal pain.  No other relevant symptoms.  No fevers or chills no nausea or vomiting.  No chest pain or shortness of breath.    REVIEW OF SYSTEMS       Constitutional: No fevers or chills   HENT: No sore throat, rhinorrhea, or earache   Eyes: No blurry vision or double vision no drainage   Cardiovascular: No chest pain or tachycardia   Respiratory: No wheezing or shortness of breath no cough   Gastrointestinal: No nausea, vomiting, diarrhea, constipation, positive abdominal pain   : No hematuria or dysuria   Musculoskeletal: No swelling or pain   Skin: No rash   Neurological: No focal neurologic complaints, paresthesias, weakness, or headache     PAST MEDICAL HISTORY    has a past medical history of Breast disorder and Scoliosis.    SURGICAL HISTORY      has a past surgical history that includes Incision and drainage anterior neck (Left, 10/05/2022) and Neck surgery (Left, 10/5/2022).    CURRENT MEDICATIONS       Previous Medications    MAGNESIUM OXIDE 400 MG CAPS    Take 400 mg by mouth at bedtime    ONDANSETRON (ZOFRAN-ODT) 4 MG DISINTEGRATING TABLET    Take 1 tablet by mouth every 8 hours

## 2024-07-23 NOTE — DISCHARGE INSTRUCTIONS
Tylenol as needed for pain  Return immediately if any worsening symptoms or any other concerns    Please understand that early in the process of an illness or injury, an emergency department workup can be falsely reassuring.      Tell us how we did visit: [email or web address]   and let us know about your experience

## 2024-08-14 ENCOUNTER — ROUTINE PRENATAL (OUTPATIENT)
Dept: OBGYN CLINIC | Age: 19
End: 2024-08-14
Payer: COMMERCIAL

## 2024-08-14 ENCOUNTER — ROUTINE PRENATAL (OUTPATIENT)
Dept: PERINATAL CARE | Age: 19
End: 2024-08-14
Payer: COMMERCIAL

## 2024-08-14 VITALS
BODY MASS INDEX: 20.67 KG/M2 | DIASTOLIC BLOOD PRESSURE: 68 MMHG | SYSTOLIC BLOOD PRESSURE: 110 MMHG | WEIGHT: 113 LBS | HEART RATE: 91 BPM

## 2024-08-14 VITALS
SYSTOLIC BLOOD PRESSURE: 115 MMHG | HEART RATE: 70 BPM | RESPIRATION RATE: 16 BRPM | HEIGHT: 62 IN | BODY MASS INDEX: 21.18 KG/M2 | WEIGHT: 115.08 LBS | TEMPERATURE: 98.1 F | DIASTOLIC BLOOD PRESSURE: 60 MMHG

## 2024-08-14 DIAGNOSIS — Z3A.20 20 WEEKS GESTATION OF PREGNANCY: ICD-10-CM

## 2024-08-14 DIAGNOSIS — Z36.86 ENCOUNTER FOR SCREENING FOR RISK OF PRE-TERM LABOR: ICD-10-CM

## 2024-08-14 DIAGNOSIS — Z34.82 ENCOUNTER FOR SUPERVISION OF OTHER NORMAL PREGNANCY IN SECOND TRIMESTER: ICD-10-CM

## 2024-08-14 DIAGNOSIS — O26.12 LOW WEIGHT GAIN DURING PREGNANCY IN SECOND TRIMESTER: ICD-10-CM

## 2024-08-14 DIAGNOSIS — Z3A.20 20 WEEKS GESTATION OF PREGNANCY: Primary | ICD-10-CM

## 2024-08-14 DIAGNOSIS — O99.891 CURRENT MATERNAL CONDITION AFFECTING PREGNANCY: ICD-10-CM

## 2024-08-14 DIAGNOSIS — O44.40 LOW IMPLANTATION OF PLACENTA WITHOUT HEMORRHAGE: Primary | ICD-10-CM

## 2024-08-14 PROCEDURE — 99999 PR OFFICE/OUTPT VISIT,PROCEDURE ONLY: CPT | Performed by: OBSTETRICS & GYNECOLOGY

## 2024-08-14 PROCEDURE — 76811 OB US DETAILED SNGL FETUS: CPT | Performed by: OBSTETRICS & GYNECOLOGY

## 2024-08-14 PROCEDURE — 76817 TRANSVAGINAL US OBSTETRIC: CPT | Performed by: OBSTETRICS & GYNECOLOGY

## 2024-08-14 PROCEDURE — 99212 OFFICE O/P EST SF 10 MIN: CPT | Performed by: OBSTETRICS & GYNECOLOGY

## 2024-08-14 NOTE — PROGRESS NOTES
Jaki is a  @ 20w0d who presents for MARCELA visit.  She denies LOF, VB or Ctxs.  + FM.  She is having some back spasms and her last one was this morning and lasted for about an hour.  She denies any fevers/chills, SOB, cough, sore throat, loss of taste/smell or sick contacts.  Pt denies any HA, vision changes or RUQ pain.     O:  Vitals:    24 1358   BP: 110/68   Pulse: 91     Gen: NAD  Abd: soft, nontender, gravid  Ext:  no edema      BP: 110/68  Weight - Scale: 51.3 kg (113 lb)  Pulse: 91  Patient Position: Sitting  Fundal Height (cm): 20 cm  Fetal HR: 145  Movement: Present    A/P:  Patient Active Problem List    Diagnosis Date Noted    Abscess 10/05/2022     Priority: Medium    Neck abscess 10/05/2022     Priority: Medium    Scoliosis of thoracolumbar spine 10/05/2022     Priority: Medium    High anion gap metabolic acidosis 10/05/2022     Priority: Medium    Anorexia 10/05/2022     Priority: Medium    Atypical chest pain 10/05/2022     Priority: Medium     - Discussed updated COVID precautions and policies. Reviewed updated visitor policy. Encouraged social distancing and appropriate hand washing/hygiene practices. Reviewed symptoms suspicious for COVID infection. Discussed that ACOG, SMFM, and the CDC recommend to not withold immunization in pregnant and breastfeeding women who meet criteria for receipt of the vaccine based on the ACIP recommended priority groups. All questions answered. Patient vocalized understanding.    - RSV vaccination (32-36 weeks): The patient was counseled on benefits to her baby if she receives the Pfizer RSV vaccine (Abrysvo) during pregnancy. She was informed that this vaccination is FDA approved for use during pregnancy. She will think about it and call local pharmacies       Discussed using ice/heat, icyhot, baths and lidocaine patches  Discussed s/sx that should prompt call to the office  Discussed kick counts  Pt counseled to continue PNVs  RTC in 4 wks    Cheryl ALVAREZ

## 2024-09-09 ENCOUNTER — ROUTINE PRENATAL (OUTPATIENT)
Dept: OBGYN CLINIC | Age: 19
End: 2024-09-09
Payer: COMMERCIAL

## 2024-09-09 ENCOUNTER — HOSPITAL ENCOUNTER (OUTPATIENT)
Age: 19
Setting detail: SPECIMEN
Discharge: HOME | End: 2024-09-09

## 2024-09-09 VITALS
DIASTOLIC BLOOD PRESSURE: 70 MMHG | HEIGHT: 62 IN | SYSTOLIC BLOOD PRESSURE: 112 MMHG | HEART RATE: 90 BPM | WEIGHT: 118 LBS | BODY MASS INDEX: 21.71 KG/M2

## 2024-09-09 DIAGNOSIS — M41.25 OTHER IDIOPATHIC SCOLIOSIS, THORACOLUMBAR REGION: ICD-10-CM

## 2024-09-09 DIAGNOSIS — Z3A.23 23 WEEKS GESTATION OF PREGNANCY: ICD-10-CM

## 2024-09-09 DIAGNOSIS — Z34.82 ENCOUNTER FOR SUPERVISION OF OTHER NORMAL PREGNANCY IN SECOND TRIMESTER: ICD-10-CM

## 2024-09-09 PROBLEM — L02.11 NECK ABSCESS: Status: RESOLVED | Noted: 2022-10-05 | Resolved: 2024-09-09

## 2024-09-09 PROBLEM — R07.89 ATYPICAL CHEST PAIN: Status: RESOLVED | Noted: 2022-10-05 | Resolved: 2024-09-09

## 2024-09-09 PROBLEM — L02.91 ABSCESS: Status: RESOLVED | Noted: 2022-10-05 | Resolved: 2024-09-09

## 2024-09-09 PROBLEM — E87.29 HIGH ANION GAP METABOLIC ACIDOSIS: Status: RESOLVED | Noted: 2022-10-05 | Resolved: 2024-09-09

## 2024-09-09 LAB
BACTERIA URNS QL MICRO: ABNORMAL
BILIRUB UR QL STRIP: NEGATIVE
CASTS #/AREA URNS LPF: ABNORMAL /LPF (ref 0–8)
CLARITY UR: ABNORMAL
COLOR UR: YELLOW
EPI CELLS #/AREA URNS HPF: ABNORMAL /HPF (ref 0–5)
GLUCOSE UR STRIP-MCNC: NEGATIVE MG/DL
HGB UR QL STRIP.AUTO: NEGATIVE
KETONES UR STRIP-MCNC: NEGATIVE MG/DL
LEUKOCYTE ESTERASE UR QL STRIP: ABNORMAL
NITRITE UR QL STRIP: NEGATIVE
PH UR STRIP: 7 [PH] (ref 5–8)
PROT UR STRIP-MCNC: NEGATIVE MG/DL
RBC #/AREA URNS HPF: ABNORMAL /HPF (ref 0–4)
SP GR UR STRIP: 1.01 (ref 1–1.03)
UROBILINOGEN UR STRIP-ACNC: NORMAL EU/DL (ref 0–1)
WBC #/AREA URNS HPF: ABNORMAL /HPF (ref 0–5)

## 2024-09-09 PROCEDURE — 99213 OFFICE O/P EST LOW 20 MIN: CPT | Performed by: STUDENT IN AN ORGANIZED HEALTH CARE EDUCATION/TRAINING PROGRAM

## 2024-09-10 LAB
MICROORGANISM SPEC CULT: NORMAL
SERVICE CMNT-IMP: NORMAL
SPECIMEN DESCRIPTION: NORMAL

## 2024-09-22 ENCOUNTER — HOSPITAL ENCOUNTER (EMERGENCY)
Age: 19
Discharge: HOME | End: 2024-09-22
Attending: EMERGENCY MEDICINE
Payer: COMMERCIAL

## 2024-09-22 VITALS
SYSTOLIC BLOOD PRESSURE: 121 MMHG | HEART RATE: 82 BPM | BODY MASS INDEX: 21.35 KG/M2 | TEMPERATURE: 97.7 F | DIASTOLIC BLOOD PRESSURE: 82 MMHG | OXYGEN SATURATION: 98 % | RESPIRATION RATE: 14 BRPM | WEIGHT: 116 LBS | HEIGHT: 62 IN

## 2024-09-22 DIAGNOSIS — R51.9 NONINTRACTABLE HEADACHE, UNSPECIFIED CHRONICITY PATTERN, UNSPECIFIED HEADACHE TYPE: ICD-10-CM

## 2024-09-22 DIAGNOSIS — R42 DIZZINESS: Primary | ICD-10-CM

## 2024-09-22 DIAGNOSIS — R07.9 CHEST PAIN, UNSPECIFIED TYPE: ICD-10-CM

## 2024-09-22 PROBLEM — Z3A.25 25 WEEKS GESTATION OF PREGNANCY: Status: ACTIVE | Noted: 2024-09-22

## 2024-09-22 LAB
ANION GAP SERPL CALCULATED.3IONS-SCNC: 13 MMOL/L (ref 9–16)
BASOPHILS # BLD: <0.03 K/UL (ref 0–0.2)
BASOPHILS NFR BLD: 0 % (ref 0–2)
BUN SERPL-MCNC: 6 MG/DL (ref 6–20)
CALCIUM SERPL-MCNC: 8.1 MG/DL (ref 8.6–10.4)
CHLORIDE SERPL-SCNC: 108 MMOL/L (ref 98–107)
CO2 SERPL-SCNC: 16 MMOL/L (ref 20–31)
CREAT SERPL-MCNC: 0.5 MG/DL (ref 0.5–0.9)
EOSINOPHIL # BLD: 0.07 K/UL (ref 0–0.44)
EOSINOPHILS RELATIVE PERCENT: 1 % (ref 1–4)
ERYTHROCYTE [DISTWIDTH] IN BLOOD BY AUTOMATED COUNT: 13.5 % (ref 11.8–14.4)
FLUAV AG SPEC QL: NEGATIVE
FLUBV AG SPEC QL: NEGATIVE
GFR, ESTIMATED: >90 ML/MIN/1.73M2
GLUCOSE SERPL-MCNC: 100 MG/DL (ref 74–99)
HCT VFR BLD AUTO: 30.8 % (ref 36.3–47.1)
HGB BLD-MCNC: 10.2 G/DL (ref 11.9–15.1)
IMM GRANULOCYTES # BLD AUTO: 0.11 K/UL (ref 0–0.3)
IMM GRANULOCYTES NFR BLD: 1 %
LYMPHOCYTES NFR BLD: 1.05 K/UL (ref 1.2–5.2)
LYMPHOCYTES RELATIVE PERCENT: 14 % (ref 25–45)
MCH RBC QN AUTO: 31.4 PG (ref 25.2–33.5)
MCHC RBC AUTO-ENTMCNC: 33.1 G/DL (ref 28.4–34.8)
MCV RBC AUTO: 94.8 FL (ref 82.6–102.9)
MONOCYTES NFR BLD: 0.42 K/UL (ref 0.1–1.4)
MONOCYTES NFR BLD: 5 % (ref 2–8)
NEUTROPHILS NFR BLD: 79 % (ref 34–64)
NEUTS SEG NFR BLD: 6.11 K/UL (ref 1.8–8)
NRBC BLD-RTO: 0 PER 100 WBC
PLATELET # BLD AUTO: 193 K/UL (ref 138–453)
PMV BLD AUTO: 9.9 FL (ref 8.1–13.5)
POTASSIUM SERPL-SCNC: 3.3 MMOL/L (ref 3.7–5.3)
RBC # BLD AUTO: 3.25 M/UL (ref 3.95–5.11)
SARS-COV-2 RDRP RESP QL NAA+PROBE: NOT DETECTED
SODIUM SERPL-SCNC: 137 MMOL/L (ref 136–145)
SPECIMEN DESCRIPTION: NORMAL
TROPONIN I SERPL HS-MCNC: <6 NG/L (ref 0–14)
WBC OTHER # BLD: 7.8 K/UL (ref 4.5–13.5)

## 2024-09-22 PROCEDURE — 6360000002 HC RX W HCPCS

## 2024-09-22 PROCEDURE — 85025 COMPLETE CBC W/AUTO DIFF WBC: CPT

## 2024-09-22 PROCEDURE — 96374 THER/PROPH/DIAG INJ IV PUSH: CPT

## 2024-09-22 PROCEDURE — 87804 INFLUENZA ASSAY W/OPTIC: CPT

## 2024-09-22 PROCEDURE — 84484 ASSAY OF TROPONIN QUANT: CPT

## 2024-09-22 PROCEDURE — 80048 BASIC METABOLIC PNL TOTAL CA: CPT

## 2024-09-22 PROCEDURE — 93005 ELECTROCARDIOGRAM TRACING: CPT

## 2024-09-22 PROCEDURE — 87635 SARS-COV-2 COVID-19 AMP PRB: CPT

## 2024-09-22 PROCEDURE — 96375 TX/PRO/DX INJ NEW DRUG ADDON: CPT

## 2024-09-22 PROCEDURE — 2580000003 HC RX 258

## 2024-09-22 PROCEDURE — 99284 EMERGENCY DEPT VISIT MOD MDM: CPT

## 2024-09-22 PROCEDURE — 6370000000 HC RX 637 (ALT 250 FOR IP)

## 2024-09-22 RX ORDER — 0.9 % SODIUM CHLORIDE 0.9 %
1000 INTRAVENOUS SOLUTION INTRAVENOUS ONCE
Status: COMPLETED | OUTPATIENT
Start: 2024-09-22 | End: 2024-09-22

## 2024-09-22 RX ORDER — KETOROLAC TROMETHAMINE 15 MG/ML
15 INJECTION, SOLUTION INTRAMUSCULAR; INTRAVENOUS ONCE
Status: COMPLETED | OUTPATIENT
Start: 2024-09-22 | End: 2024-09-22

## 2024-09-22 RX ORDER — ONDANSETRON 2 MG/ML
4 INJECTION INTRAMUSCULAR; INTRAVENOUS ONCE
Status: COMPLETED | OUTPATIENT
Start: 2024-09-22 | End: 2024-09-22

## 2024-09-22 RX ORDER — POTASSIUM CHLORIDE 7.45 MG/ML
10 INJECTION INTRAVENOUS ONCE
Status: DISCONTINUED | OUTPATIENT
Start: 2024-09-22 | End: 2024-09-22

## 2024-09-22 RX ADMIN — POTASSIUM BICARBONATE 40 MEQ: 782 TABLET, EFFERVESCENT ORAL at 16:09

## 2024-09-22 RX ADMIN — ONDANSETRON 4 MG: 2 INJECTION INTRAMUSCULAR; INTRAVENOUS at 14:06

## 2024-09-22 RX ADMIN — KETOROLAC TROMETHAMINE 15 MG: 15 INJECTION, SOLUTION INTRAMUSCULAR; INTRAVENOUS at 14:03

## 2024-09-22 RX ADMIN — SODIUM CHLORIDE 1000 ML: 9 INJECTION, SOLUTION INTRAVENOUS at 14:06

## 2024-09-22 ASSESSMENT — PAIN DESCRIPTION - LOCATION: LOCATION: HEAD

## 2024-09-22 ASSESSMENT — LIFESTYLE VARIABLES: HOW OFTEN DO YOU HAVE A DRINK CONTAINING ALCOHOL: NEVER

## 2024-09-22 ASSESSMENT — PAIN - FUNCTIONAL ASSESSMENT: PAIN_FUNCTIONAL_ASSESSMENT: 0-10

## 2024-09-23 ENCOUNTER — TELEPHONE (OUTPATIENT)
Dept: OBGYN CLINIC | Age: 19
End: 2024-09-23

## 2024-09-23 LAB
EKG ATRIAL RATE: 82 BPM
EKG P AXIS: 42 DEGREES
EKG P-R INTERVAL: 140 MS
EKG Q-T INTERVAL: 352 MS
EKG QRS DURATION: 76 MS
EKG QTC CALCULATION (BAZETT): 411 MS
EKG R AXIS: 44 DEGREES
EKG T AXIS: 26 DEGREES
EKG VENTRICULAR RATE: 82 BPM

## 2024-10-04 NOTE — PROGRESS NOTES
Prenatal Visit    Jaki Waters is a 19 y.o. female  at 27w5d    The patient was seen and evaluated. Reports positive fetal movements. She denies headache, vision changes, RUQ pain, contractions, vaginal bleeding and leakage of fluid.       The patient undecided the T-Dap Vaccine (27-36 weeks) this pregnancy.   The patient declined the influenza vaccine this year.     The problem list reflects the active issues addressed during today's visit  She was seen in the ED recently for dizziness and was told everything was alright.  She has gotten some headaches also  Nausea has returned recently    Vitals:    BP: 122/75  Weight - Scale: 55.5 kg (122 lb 6.4 oz)  Fundal Height (cm): 27 cm  Fetal HR: 136  Movement: Present     28 Week Labs:  The patient is RH +, Rhogam not indicated  ABO/Rh   Date Value Ref Range Status   06/10/2024 O POSITIVE  Final       1hr GTT: in process   28 week CBC:   Lab Results   Component Value Date    WBC 7.8 2024    HGB 10.2 (L) 2024    HCT 30.8 (L) 2024    MCV 94.8 2024     2024     UA w/ Ur C&S: neg     Assessment & Plan:  Jaki Waters is a 19 y.o. female  at 27w5d   - Discussed signs or symptoms of when to call office   - Discussed fetal movement parameters  - 28 week labs in process   - discussed recommendations for TDAP immunization, patient undecided TDAP - may do at next visit   - Next M appointment 10/16/2024   - continue prenatal vitamin   - new order for zofran sent   - discussed adequate hydration and adding Liquid IV or gatorade   - The ACIP recommended pregnant patients be included in phase 1C of vaccine distribution. This decision is supported by [facility] and ACOG. As of 2021, there have been over 30,000 pregnant patients included in the V-safe post COVID vaccination safety . Most (73%) reports to [facility]ERS among pregnant women involved non-pregnancyspecific adverse events (e.g., local and systemic

## 2024-10-07 ENCOUNTER — HOSPITAL ENCOUNTER (OUTPATIENT)
Age: 19
Setting detail: SPECIMEN
Discharge: HOME | End: 2024-10-07

## 2024-10-07 ENCOUNTER — ROUTINE PRENATAL (OUTPATIENT)
Dept: OBGYN CLINIC | Age: 19
End: 2024-10-07
Payer: COMMERCIAL

## 2024-10-07 VITALS — WEIGHT: 122.4 LBS | DIASTOLIC BLOOD PRESSURE: 75 MMHG | SYSTOLIC BLOOD PRESSURE: 122 MMHG | BODY MASS INDEX: 22.39 KG/M2

## 2024-10-07 DIAGNOSIS — O21.9 NAUSEA/VOMITING IN PREGNANCY: ICD-10-CM

## 2024-10-07 DIAGNOSIS — Z3A.23 23 WEEKS GESTATION OF PREGNANCY: ICD-10-CM

## 2024-10-07 DIAGNOSIS — Z34.03 ENCOUNTER FOR SUPERVISION OF NORMAL FIRST PREGNANCY IN THIRD TRIMESTER: Primary | ICD-10-CM

## 2024-10-07 DIAGNOSIS — Z3A.27 27 WEEKS GESTATION OF PREGNANCY: ICD-10-CM

## 2024-10-07 PROCEDURE — 99213 OFFICE O/P EST LOW 20 MIN: CPT | Performed by: NURSE PRACTITIONER

## 2024-10-07 RX ORDER — ONDANSETRON 4 MG/1
4 TABLET, ORALLY DISINTEGRATING ORAL EVERY 8 HOURS PRN
Qty: 40 TABLET | Refills: 1 | Status: SHIPPED | OUTPATIENT
Start: 2024-10-07

## 2024-10-16 ENCOUNTER — ROUTINE PRENATAL (OUTPATIENT)
Dept: PERINATAL CARE | Age: 19
End: 2024-10-16
Payer: COMMERCIAL

## 2024-10-16 VITALS
DIASTOLIC BLOOD PRESSURE: 69 MMHG | BODY MASS INDEX: 22.43 KG/M2 | TEMPERATURE: 98.1 F | HEIGHT: 62 IN | SYSTOLIC BLOOD PRESSURE: 110 MMHG | HEART RATE: 99 BPM | OXYGEN SATURATION: 99 % | WEIGHT: 121.91 LBS

## 2024-10-16 DIAGNOSIS — Z03.72 SUSPECTED PLACENTAL PROBLEM NOT FOUND: ICD-10-CM

## 2024-10-16 DIAGNOSIS — Z36.4 ULTRASOUND FOR ANTENATAL SCREENING FOR FETAL GROWTH RESTRICTION: ICD-10-CM

## 2024-10-16 DIAGNOSIS — Z36.3 ENCOUNTER FOR ROUTINE SCREENING FOR MALFORMATION USING ULTRASONICS: ICD-10-CM

## 2024-10-16 DIAGNOSIS — Z3A.29 29 WEEKS GESTATION OF PREGNANCY: ICD-10-CM

## 2024-10-16 DIAGNOSIS — O26.13 LOW WEIGHT GAIN DURING PREGNANCY IN THIRD TRIMESTER: Primary | ICD-10-CM

## 2024-10-16 DIAGNOSIS — O99.891 CURRENT MATERNAL CONDITION AFFECTING PREGNANCY: ICD-10-CM

## 2024-10-16 DIAGNOSIS — O44.40 LOW IMPLANTATION OF PLACENTA WITHOUT HEMORRHAGE: ICD-10-CM

## 2024-10-16 PROCEDURE — 99999 PR OFFICE/OUTPT VISIT,PROCEDURE ONLY: CPT | Performed by: OBSTETRICS & GYNECOLOGY

## 2024-10-16 PROCEDURE — 76819 FETAL BIOPHYS PROFIL W/O NST: CPT | Performed by: OBSTETRICS & GYNECOLOGY

## 2024-10-16 PROCEDURE — 76805 OB US >/= 14 WKS SNGL FETUS: CPT | Performed by: OBSTETRICS & GYNECOLOGY

## 2024-10-16 PROCEDURE — 76817 TRANSVAGINAL US OBSTETRIC: CPT | Performed by: OBSTETRICS & GYNECOLOGY

## 2024-10-23 ENCOUNTER — ROUTINE PRENATAL (OUTPATIENT)
Dept: OBGYN CLINIC | Age: 19
End: 2024-10-23
Payer: COMMERCIAL

## 2024-10-23 ENCOUNTER — HOSPITAL ENCOUNTER (OUTPATIENT)
Age: 19
Setting detail: SPECIMEN
Discharge: HOME | End: 2024-10-23

## 2024-10-23 VITALS
DIASTOLIC BLOOD PRESSURE: 74 MMHG | SYSTOLIC BLOOD PRESSURE: 119 MMHG | HEART RATE: 92 BPM | WEIGHT: 121 LBS | BODY MASS INDEX: 22.13 KG/M2

## 2024-10-23 DIAGNOSIS — O09.93 HIGH-RISK PREGNANCY IN THIRD TRIMESTER: ICD-10-CM

## 2024-10-23 DIAGNOSIS — R12 HEART BURN: ICD-10-CM

## 2024-10-23 DIAGNOSIS — O09.93 HIGH-RISK PREGNANCY IN THIRD TRIMESTER: Primary | ICD-10-CM

## 2024-10-23 DIAGNOSIS — Z3A.30 30 WEEKS GESTATION OF PREGNANCY: ICD-10-CM

## 2024-10-23 DIAGNOSIS — Z23 NEED FOR TDAP VACCINATION: ICD-10-CM

## 2024-10-23 DIAGNOSIS — O99.013 ANEMIA DURING PREGNANCY IN THIRD TRIMESTER: ICD-10-CM

## 2024-10-23 PROBLEM — F41.9 ANXIETY: Status: ACTIVE | Noted: 2024-10-23

## 2024-10-23 PROBLEM — Z3A.25 25 WEEKS GESTATION OF PREGNANCY: Status: RESOLVED | Noted: 2024-09-22 | Resolved: 2024-10-23

## 2024-10-23 PROCEDURE — 90715 TDAP VACCINE 7 YRS/> IM: CPT | Performed by: STUDENT IN AN ORGANIZED HEALTH CARE EDUCATION/TRAINING PROGRAM

## 2024-10-23 PROCEDURE — 99213 OFFICE O/P EST LOW 20 MIN: CPT | Performed by: STUDENT IN AN ORGANIZED HEALTH CARE EDUCATION/TRAINING PROGRAM

## 2024-10-23 PROCEDURE — 90471 IMMUNIZATION ADMIN: CPT | Performed by: STUDENT IN AN ORGANIZED HEALTH CARE EDUCATION/TRAINING PROGRAM

## 2024-10-23 RX ORDER — FERROUS GLUCONATE 324(38)MG
324 TABLET ORAL
Qty: 30 TABLET | Refills: 3 | Status: SHIPPED | OUTPATIENT
Start: 2024-10-23

## 2024-10-23 NOTE — PROGRESS NOTES
After obtaining consent, and per orders of Dr. Feldman, injection of tdap given in Right deltoid by Hanh Peoples MA. Patient instructed to remain in clinic for 20 minutes afterwards, and to report any adverse reaction to me immediately.

## 2024-10-23 NOTE — PROGRESS NOTES
Prenatal Visit    Jaki Waters is a 19 y.o. female  at 30w0d IUP    The patient was seen and evaluated. She has no complaints today besides some heart burn. Reports she is not getting relief with pepcid.  She reports positive fetal movements. She denies contractions, vaginal bleeding and leakage of fluid. Signs and symptoms of labor and pre-eclampsia were reviewed with the patient in detail. She is to report any of these if they occur. She currently denies any of these.    The problem list reflects the active issues addressed during today's visit    Vitals:     BP: 119/74  Weight - Scale: 54.9 kg (121 lb)  Pulse: 92  Patient Position: Sitting  Fundal Height (cm): 30 cm  Fetal HR: 142  Movement: Present     PHYSICAL:   General appearance: no apparent distress, alert and cooperative  HEENT: head atraumatic, normocephalic, trachea midline, moist mucous membranes   Neurologic: alert, oriented, normal speech   Lungs: no increased work of breathing,   Abdomen: soft, gravid, non-tender on palpation    Musculoskeletal: no gross abnormalities, range of motion appropriate for age   Psychiatric: mood appropriate, normal affect      Assessment & Plan:  Jaki Waters is a 19 y.o. female  at 30w0d   - VSS     - Previous prenatal labs reviewed    - BMP re ordered to ensure K has returned to baseline as it was 3.3 back in September. She states she always has low K and does not take supplements    - Tpal, 1 hr GTT, urine culture ordered to complete third tri testing. She vomited during her last glucose test. Will premedicate with zofran this time    - NIPT low risk    - Continue taking prenatal vitamins QD    - Tdap vaccination: given today     - Influenza vaccination: declined today     - Rhogam: is not indicated in this pregnancy     - COVID-19 vaccination:advise booster during pregnancy    - Discussed colostrum and when to expect leakage during pregnancy    - Rx prilosec to see if this gives her any

## 2024-10-24 LAB
MICROORGANISM SPEC CULT: NORMAL
SERVICE CMNT-IMP: NORMAL
SPECIMEN DESCRIPTION: NORMAL

## 2024-10-28 ENCOUNTER — HOSPITAL ENCOUNTER (OUTPATIENT)
Age: 19
Setting detail: SPECIMEN
Discharge: HOME | End: 2024-10-28

## 2024-10-28 DIAGNOSIS — O09.93 HIGH-RISK PREGNANCY IN THIRD TRIMESTER: ICD-10-CM

## 2024-10-28 DIAGNOSIS — Z3A.30 30 WEEKS GESTATION OF PREGNANCY: ICD-10-CM

## 2024-10-28 LAB
AMOUNT GLUCOSE GIVEN: 50 G
ANION GAP SERPL CALCULATED.3IONS-SCNC: 10 MMOL/L (ref 9–16)
BUN SERPL-MCNC: 5 MG/DL (ref 6–20)
CALCIUM SERPL-MCNC: 8.6 MG/DL (ref 8.6–10.4)
CHLORIDE SERPL-SCNC: 107 MMOL/L (ref 98–107)
CO2 SERPL-SCNC: 21 MMOL/L (ref 20–31)
CREAT SERPL-MCNC: 0.5 MG/DL (ref 0.5–0.9)
GFR, ESTIMATED: >90 ML/MIN/1.73M2
GLUCOSE 3H P 100 G GLC PO SERPL-MCNC: 132 MG/DL
GLUCOSE SERPL-MCNC: 135 MG/DL (ref 74–99)
POTASSIUM SERPL-SCNC: 3.9 MMOL/L (ref 3.7–5.3)
SODIUM SERPL-SCNC: 138 MMOL/L (ref 136–145)
T PALLIDUM AB SER QL IA: NONREACTIVE

## 2024-10-30 PROBLEM — O99.810 ABNORMAL GLUCOSE TOLERANCE IN PREGNANCY: Status: ACTIVE | Noted: 2024-10-30

## 2024-11-05 NOTE — PROGRESS NOTES
Jaki is a  @ 32w0d who presents for MARCELA visit.  She denies LOF, VB or Ctxs.  + FM.  She has been having some muscle spasms in her lower back on the right side.  She denies any fevers/chills, SOB, cough, sore throat, loss of taste/smell or sick contacts.  Pt denies any HA, vision changes or RUQ pain.     O:  Vitals:    24 1100   BP: 116/66   Pulse: 79     Gen: NAD  Abd: soft, nontender, gravid  Ext:  no edema      BP: 116/66  Weight - Scale: 55.8 kg (123 lb)  Pulse: 79  Patient Position: Sitting  Fundal Height (cm): 31 cm  Fetal HR: 145  Movement: Present    A/P:  Patient Active Problem List    Diagnosis Date Noted    Scoliosis of thoracolumbar spine 10/05/2022     Priority: Medium     Anesthesia Referral sent 24       Anorexia 10/05/2022     Priority: Medium    Abnormal glucose tolerance in pregnancy 10/30/2024     Elevated 1 hr       Anxiety 10/23/2024    Breech presentation 2024     Repeat MFM Scan 10/16/24         - Discussed updated COVID precautions and policies. Reviewed updated visitor policy. Encouraged social distancing and appropriate hand washing/hygiene practices. Reviewed symptoms suspicious for COVID infection. Discussed that ACOG, SMFM, and the CDC recommend to not withold immunization in pregnant and breastfeeding women who meet criteria for receipt of the vaccine based on the ACIP recommended priority groups. All questions answered. Patient vocalized understanding.    - RSV vaccination (32-36 weeks): The patient was counseled on benefits to her baby if she receives the Pfizer RSV vaccine (Abrysvo) during pregnancy. She was informed that this vaccination is FDA approved for use during pregnancy. She will think about it and call local pharmacies       Discussed 3 hr GTT with pt  Discussed s/sx that should prompt call to the office  Discussed kick counts  Pt counseled to continue PNVs  RTC in 2 wks    Cheryl Martinez MD

## 2024-11-06 ENCOUNTER — ROUTINE PRENATAL (OUTPATIENT)
Dept: OBGYN CLINIC | Age: 19
End: 2024-11-06
Payer: COMMERCIAL

## 2024-11-06 VITALS
WEIGHT: 123 LBS | DIASTOLIC BLOOD PRESSURE: 66 MMHG | BODY MASS INDEX: 22.5 KG/M2 | SYSTOLIC BLOOD PRESSURE: 116 MMHG | HEART RATE: 79 BPM

## 2024-11-06 DIAGNOSIS — Z3A.32 32 WEEKS GESTATION OF PREGNANCY: Primary | ICD-10-CM

## 2024-11-06 DIAGNOSIS — Z34.03 ENCOUNTER FOR SUPERVISION OF NORMAL FIRST PREGNANCY IN THIRD TRIMESTER: ICD-10-CM

## 2024-11-06 PROCEDURE — 99213 OFFICE O/P EST LOW 20 MIN: CPT | Performed by: OBSTETRICS & GYNECOLOGY

## 2024-11-09 ENCOUNTER — HOSPITAL ENCOUNTER (EMERGENCY)
Age: 19
Discharge: HOME | End: 2024-11-10
Attending: EMERGENCY MEDICINE
Payer: COMMERCIAL

## 2024-11-09 DIAGNOSIS — R07.89 CHEST DISCOMFORT: ICD-10-CM

## 2024-11-09 DIAGNOSIS — B37.31 VAGINAL CANDIDA: Primary | ICD-10-CM

## 2024-11-09 DIAGNOSIS — R82.71 ASYMPTOMATIC BACTERIURIA: ICD-10-CM

## 2024-11-09 DIAGNOSIS — Z3A.32 32 WEEKS GESTATION OF PREGNANCY: ICD-10-CM

## 2024-11-09 DIAGNOSIS — R10.9 ABDOMINAL DISCOMFORT: ICD-10-CM

## 2024-11-09 LAB
ANION GAP SERPL CALCULATED.3IONS-SCNC: 12 MMOL/L (ref 9–16)
BACTERIA URNS QL MICRO: ABNORMAL
BASOPHILS # BLD: 0.04 K/UL (ref 0–0.2)
BASOPHILS NFR BLD: 1 % (ref 0–2)
BILIRUB UR QL STRIP: NEGATIVE
BUN SERPL-MCNC: 6 MG/DL (ref 6–20)
CALCIUM SERPL-MCNC: 8.6 MG/DL (ref 8.6–10.4)
CANDIDA SPECIES: POSITIVE
CASTS #/AREA URNS LPF: ABNORMAL /LPF (ref 0–8)
CHLORIDE SERPL-SCNC: 105 MMOL/L (ref 98–107)
CLARITY UR: ABNORMAL
CO2 SERPL-SCNC: 20 MMOL/L (ref 20–31)
COLOR UR: YELLOW
CREAT SERPL-MCNC: 0.5 MG/DL (ref 0.6–0.9)
D DIMER PPP FEU-MCNC: 0.64 UG/ML FEU (ref 0–0.57)
EOSINOPHIL # BLD: 0.08 K/UL (ref 0–0.44)
EOSINOPHILS RELATIVE PERCENT: 1 % (ref 1–4)
EPI CELLS #/AREA URNS HPF: ABNORMAL /HPF (ref 0–5)
ERYTHROCYTE [DISTWIDTH] IN BLOOD BY AUTOMATED COUNT: 13.5 % (ref 11.8–14.4)
GARDNERELLA VAGINALIS: NEGATIVE
GFR, ESTIMATED: >90 ML/MIN/1.73M2
GLUCOSE SERPL-MCNC: 103 MG/DL (ref 74–99)
GLUCOSE UR STRIP-MCNC: NEGATIVE MG/DL
HCT VFR BLD AUTO: 35.7 % (ref 36.3–47.1)
HGB BLD-MCNC: 11.9 G/DL (ref 11.9–15.1)
HGB UR QL STRIP.AUTO: NEGATIVE
IMM GRANULOCYTES # BLD AUTO: 0.34 K/UL (ref 0–0.3)
IMM GRANULOCYTES NFR BLD: 4 %
KETONES UR STRIP-MCNC: NEGATIVE MG/DL
LEUKOCYTE ESTERASE UR QL STRIP: ABNORMAL
LYMPHOCYTES NFR BLD: 1.61 K/UL (ref 1.2–5.2)
LYMPHOCYTES RELATIVE PERCENT: 18 % (ref 25–45)
MCH RBC QN AUTO: 31.2 PG (ref 25.2–33.5)
MCHC RBC AUTO-ENTMCNC: 33.3 G/DL (ref 28.4–34.8)
MCV RBC AUTO: 93.7 FL (ref 82.6–102.9)
MONOCYTES NFR BLD: 0.58 K/UL (ref 0.1–1.4)
MONOCYTES NFR BLD: 7 % (ref 2–8)
NEUTROPHILS NFR BLD: 69 % (ref 34–64)
NEUTS SEG NFR BLD: 6.18 K/UL (ref 1.8–8)
NITRITE UR QL STRIP: NEGATIVE
NRBC BLD-RTO: 0 PER 100 WBC
PH UR STRIP: 7 [PH] (ref 5–8)
PLATELET # BLD AUTO: 190 K/UL (ref 138–453)
PMV BLD AUTO: 9.4 FL (ref 8.1–13.5)
POTASSIUM SERPL-SCNC: 3.1 MMOL/L (ref 3.7–5.3)
PROT UR STRIP-MCNC: NEGATIVE MG/DL
RBC # BLD AUTO: 3.81 M/UL (ref 3.95–5.11)
RBC #/AREA URNS HPF: ABNORMAL /HPF (ref 0–4)
SODIUM SERPL-SCNC: 137 MMOL/L (ref 136–145)
SOURCE: ABNORMAL
SP GR UR STRIP: 1.01 (ref 1–1.03)
TRICHOMONAS: NEGATIVE
UROBILINOGEN UR STRIP-ACNC: NORMAL EU/DL (ref 0–1)
WBC #/AREA URNS HPF: ABNORMAL /HPF (ref 0–5)
WBC OTHER # BLD: 8.8 K/UL (ref 4.5–13.5)

## 2024-11-09 PROCEDURE — 80048 BASIC METABOLIC PNL TOTAL CA: CPT

## 2024-11-09 PROCEDURE — 87510 GARDNER VAG DNA DIR PROBE: CPT

## 2024-11-09 PROCEDURE — 87491 CHLMYD TRACH DNA AMP PROBE: CPT

## 2024-11-09 PROCEDURE — 93005 ELECTROCARDIOGRAM TRACING: CPT

## 2024-11-09 PROCEDURE — 85025 COMPLETE CBC W/AUTO DIFF WBC: CPT

## 2024-11-09 PROCEDURE — 84484 ASSAY OF TROPONIN QUANT: CPT

## 2024-11-09 PROCEDURE — 6370000000 HC RX 637 (ALT 250 FOR IP)

## 2024-11-09 PROCEDURE — 87480 CANDIDA DNA DIR PROBE: CPT

## 2024-11-09 PROCEDURE — 81001 URINALYSIS AUTO W/SCOPE: CPT

## 2024-11-09 PROCEDURE — 85379 FIBRIN DEGRADATION QUANT: CPT

## 2024-11-09 PROCEDURE — 87591 N.GONORRHOEAE DNA AMP PROB: CPT

## 2024-11-09 PROCEDURE — 87660 TRICHOMONAS VAGIN DIR PROBE: CPT

## 2024-11-09 PROCEDURE — 99284 EMERGENCY DEPT VISIT MOD MDM: CPT

## 2024-11-09 RX ORDER — POTASSIUM CHLORIDE 1500 MG/1
40 TABLET, EXTENDED RELEASE ORAL ONCE
Status: COMPLETED | OUTPATIENT
Start: 2024-11-09 | End: 2024-11-09

## 2024-11-09 RX ORDER — ACETAMINOPHEN 500 MG
1000 TABLET ORAL ONCE
Status: COMPLETED | OUTPATIENT
Start: 2024-11-09 | End: 2024-11-09

## 2024-11-09 RX ORDER — ONDANSETRON 4 MG/1
4 TABLET, FILM COATED ORAL ONCE
Status: COMPLETED | OUTPATIENT
Start: 2024-11-09 | End: 2024-11-10

## 2024-11-09 RX ADMIN — ACETAMINOPHEN 1000 MG: 500 TABLET ORAL at 21:54

## 2024-11-09 RX ADMIN — POTASSIUM CHLORIDE 40 MEQ: 1500 TABLET, EXTENDED RELEASE ORAL at 22:31

## 2024-11-09 ASSESSMENT — PAIN SCALES - GENERAL: PAINLEVEL_OUTOF10: 6

## 2024-11-09 ASSESSMENT — PAIN - FUNCTIONAL ASSESSMENT: PAIN_FUNCTIONAL_ASSESSMENT: 0-10

## 2024-11-10 ENCOUNTER — HOSPITAL ENCOUNTER (OUTPATIENT)
Age: 19
Discharge: HOME | End: 2024-11-10
Attending: OBSTETRICS & GYNECOLOGY | Admitting: OBSTETRICS & GYNECOLOGY
Payer: COMMERCIAL

## 2024-11-10 VITALS
WEIGHT: 123 LBS | BODY MASS INDEX: 22.63 KG/M2 | HEART RATE: 77 BPM | SYSTOLIC BLOOD PRESSURE: 110 MMHG | OXYGEN SATURATION: 99 % | HEIGHT: 62 IN | RESPIRATION RATE: 16 BRPM | DIASTOLIC BLOOD PRESSURE: 64 MMHG | TEMPERATURE: 98.2 F

## 2024-11-10 VITALS
HEART RATE: 96 BPM | OXYGEN SATURATION: 96 % | SYSTOLIC BLOOD PRESSURE: 124 MMHG | DIASTOLIC BLOOD PRESSURE: 86 MMHG | RESPIRATION RATE: 18 BRPM | TEMPERATURE: 98 F

## 2024-11-10 LAB — TROPONIN I SERPL HS-MCNC: <6 NG/L (ref 0–14)

## 2024-11-10 PROCEDURE — 99213 OFFICE O/P EST LOW 20 MIN: CPT

## 2024-11-10 PROCEDURE — 6370000000 HC RX 637 (ALT 250 FOR IP)

## 2024-11-10 RX ORDER — CLOTRIMAZOLE 1 %
CREAM WITH APPLICATOR VAGINAL
Qty: 45 G | Refills: 0 | Status: ON HOLD | OUTPATIENT
Start: 2024-11-10 | End: 2024-11-10 | Stop reason: HOSPADM

## 2024-11-10 RX ORDER — CEPHALEXIN 500 MG/1
500 CAPSULE ORAL 2 TIMES DAILY
Qty: 14 CAPSULE | Refills: 0 | Status: SHIPPED | OUTPATIENT
Start: 2024-11-10 | End: 2024-11-17

## 2024-11-10 RX ORDER — FLUCONAZOLE 150 MG/1
150 TABLET ORAL ONCE
Qty: 1 TABLET | Refills: 0 | Status: SHIPPED | OUTPATIENT
Start: 2024-11-10 | End: 2024-11-10

## 2024-11-10 RX ADMIN — ONDANSETRON HYDROCHLORIDE 4 MG: 4 TABLET, FILM COATED ORAL at 00:22

## 2024-11-10 NOTE — ED PROVIDER NOTES
Note Started: 10:21 PM EMILIANO         [facility]     Emergency Department     Faculty Attestation    I performed a history and physical examination of the patient and discussed management with the resident. I reviewed the resident’s note and agree with the documented findings and plan of care. Any areas of disagreement are noted on the chart. I was personally present for the key portions of any procedures. I have documented in the chart those procedures where I was not present during the key portions. I have reviewed the emergency nurses triage note. I agree with the chief complaint, past medical history, past surgical history, allergies, medications, social and family history as documented unless otherwise noted below.        For Physician Assistant/ Nurse Practitioner cases/documentation I have personally evaluated this patient and have completed at least one if not all key elements of the E/M (history, physical exam, and MDM). Additional findings are as noted.  I have personally seen and evaluated the patient.  I find the patient's history and physical exam are consistent with the NP/PA documentation.  I agree with the care provided, treatment rendered, disposition and follow-up plan.     at 32 weeks 3 days presenting with abdominal pain, chest pain, headache.  Symptoms been present for the last day.  No inciting events.  Has difficulty describing the feeling in her head and chest.  Does not describe it as squeezing pressing or crushing.   Symptoms happen both at rest and with exertion.  No syncope.    Exam:  General : Laying on the bed, awake, alert, and in no acute distress  CV : normal rate and regular rhythm  Lungs : Breathing comfortably on room air with no tachypnea, hypoxia, or increased work of breathing    DDx: Increased intra-abdominal pressure from growing baby, compressive symptoms.  No infectious symptoms to suggest pneumonia, cholecystitis.  No neurologic deficits to suggest

## 2024-11-10 NOTE — ED NOTES
Pt presents to ED through triage with c/o chest pain, abdominal pain, intermitted shortness of breath and dizziness  Pt states she has had worsening abdominal pain for a week with shortness of breath with no injury, chest pain for two days and dizziness beginning today  Pt denies injury  Pt denies diaphoresis, nausea/vomiting with chest pain  Pt states she was working at normal activity and she became dizzy  Pt 36 weeks pregnant   Pt is A&Ox4, even unlabored RR NAD  Pt connected to full cardiac monitor, EKG obtained, IV established   Pt resting comfortably on cot with call light within reach   Pt provided warm blankets pt denies need at this time

## 2024-11-10 NOTE — ED PROVIDER NOTES
[facility] ED  Emergency Department Encounter  Emergency Medicine Resident     Pt Name:Jaki Waters  MRN: [id number]  Birthdate 2005  Date of evaluation: 11/9/24  PCP:  No primary care provider on file.  Note Started: 9:34 PM EST      CHIEF COMPLAINT       Chief Complaint   Patient presents with    Abdominal Pain    Chest Pain    Dizziness    Shortness of Breath       HISTORY OF PRESENT ILLNESS  (Location/Symptom, Timing/Onset, Context/Setting, Quality, Duration, Modifying Factors, Severity.)      Jaki Waters is a 19-year-old female that is a G2, P0 at 32 weeks who presents for abdominal discomfort for 1 week, chest pain for 2 days as well as intermittent shortness of breath with dizziness.  Patient denies any recent sick contacts.  Denies any asymmetry or swelling in her legs.  No reported injuries or falls.  Patient denies any vaginal bleeding but does endorse increased vaginal discharge with no itching or discomfort.  No reported dysuria or hematuria.    Patient's previous pregnancy ended in a miscarriage at approximately 6 weeks    Patient is following up with OB/GYN regularly.  In September, baby was breech position.    Denies any fevers or chills.  Patient tolerating oral intake of food and water.  She also endorses a mild headache without vision changes.    PAST MEDICAL / SURGICAL / SOCIAL / FAMILY HISTORY      has a past medical history of Breast disorder and Scoliosis.       has a past surgical history that includes Incision and drainage anterior neck (Left, 10/05/2022) and Neck surgery (Left, 10/5/2022).      Social History     Socioeconomic History    Marital status: Single     Spouse name: Not on file    Number of children: Not on file    Years of education: Not on file    Highest education level: Not on file   Occupational History    Not on file   Tobacco Use    Smoking status: Never    Smokeless tobacco: Never    Tobacco comments:     Denied vaping 7/1024   Vaping

## 2024-11-10 NOTE — DISCHARGE INSTRUCTIONS
-You were seen and evaluated by emergency medicine physicians at [facility].    -Please follow-up with your primary care physician and/or with the referrals to specialist.    -You were diagnosed with: Vaginal Candida, asymptomatic bacteriuria, chest discomfort, abdominal discomfort, 32 weeks pregnant    -Lab work came back positive for Candida vaginitis, asymptomatic bacteria, which she will be treated for with antifungal and antibiotics, respectively.  Your cardiopulmonary workup was negative.  OB would like to see you for fetal monitoring.  -Point-of-care ultrasound showed good fetal movement with a heart rate of 139 bpm  -Prescriptions of your medications have been sent to your pharmacy    -Please return to the Emergency Department if you are experiencing the following symptoms acutely: Vaginal bleeding, vaginal discharge, vaginal irritation, headache, fever, chills, nausea, vomiting, chest pain, shortness of breath, abdominal pain, change with urination, change with bowel movements, change in your skin/hair/nail, weakness, fatigue, altered mental status and/or any change from baseline health.    -Thank you for coming to [facility].

## 2024-11-10 NOTE — ED PROVIDER NOTES
Faculty Sign-Out Attestation  Handoff taken on the following patient from prior Attending Physician: Treva  Note Started: 11:53 PM EST    I was available and discussed any additional care issues that arose and coordinated the management plans with the resident(s) caring for the patient during my duty period. Any areas of disagreement with resident’s documentation of care or procedures are noted on the chart. I was personally present for the key portions of any/all procedures during my duty period. I have documented in the chart those procedures where I was not present during the key portions.    32 wk, cp, abdominal pain, trop pending,   D dimer years -, trop <6,   > L&D    Boy Norton,   Attending Physician      Boy Norton,   11/09/24 1381       Boy Norton DO  11/10/24 0040

## 2024-11-10 NOTE — PROGRESS NOTES
TESTING NOTE    Jaki Waters is a 19 y.o. female  at 32w4d    The patient was seen and examined. She is here today for  testing after she was seen and cleared from the ED for CP and abdominal pain. She was diagnosed with a UTI and yeast infection with a negative cardiac workup.     The baby is moving well and she complains of some pressure in her pelvis.    Patient Active Problem List   Diagnosis    Scoliosis of thoracolumbar spine    Anorexia    Breech presentation    Anxiety    Abnormal glucose tolerance in pregnancy       Vitals:  Vitals:    11/10/24 0112   BP: 110/64   Pulse: 77   Resp: 16   Temp: 98.2 °F (36.8 °C)   TempSrc: Oral   SpO2: 99%   Weight: 55.8 kg (123 lb)   Height: 1.575 m (5' 2\")         NST:   Fetal heart rate baseline: 140, moderate variability, accelerations present, absent decelerations     The tracing has been reviewed and is considered reactive.      Assessment/Plan:  Jaki Waters is a 19 y.o. female  at 32w4d  NST REACTIVE   - The patient will continue with her scheduled office appointments. Next appointment on 24.    - She will continue with her  testing as scheduled.       - Signs and Symptoms of  labor precautions were reviewed.   - She was counseled on adequate hydration prior to discharge   - The patient was instructed on fetal kick counts.  Abdominal pain and pressure is likely secondary to inflammatory processes related to UTI/yeast infection. Keflex and diflucan have been sent for patient treatment regimen.  Currently patient is vitally and hemodynamically stable for DC.  Dr. Phillips has reviewed this NST and agrees with the above stated assessment and plan.   Discussed results with Dr. Phillips who is agreeable to the above plan.     Ann-Marie Boucher MD  Ob/Gyn Resident   11/10/2024, 1:37 AM

## 2024-11-10 NOTE — ED NOTES
The following labs were labeled with appropriate pt sticker and tubed to lab:     [x] Blue     [x] Lavender   [] on ice  [x] Green/yellow  [x] Green/black [] on ice  [] Melendez  [] on ice  [x] Yellow  [] Red  [] Pink  [] Type/ Screen  [] ABG  [] VBG    [] COVID-19 swab    [] Rapid  [] PCR  [] Flu swab  [] Peds Viral Panel     [] Urine Sample  [] Fecal Sample  [] Pelvic Cultures  [] Blood Cultures  [] X 2  [] STREP Cultures  [] Wound Cultures

## 2024-11-11 LAB
C TRACH DNA SPEC QL PROBE+SIG AMP: NEGATIVE
EKG ATRIAL RATE: 93 BPM
EKG P AXIS: 47 DEGREES
EKG P-R INTERVAL: 128 MS
EKG Q-T INTERVAL: 340 MS
EKG QRS DURATION: 74 MS
EKG QTC CALCULATION (BAZETT): 422 MS
EKG R AXIS: 30 DEGREES
EKG T AXIS: 18 DEGREES
EKG VENTRICULAR RATE: 93 BPM
N GONORRHOEA DNA SPEC QL PROBE+SIG AMP: NEGATIVE
SPECIMEN DESCRIPTION: NORMAL

## 2024-11-11 PROCEDURE — 93010 ELECTROCARDIOGRAM REPORT: CPT | Performed by: INTERNAL MEDICINE

## 2024-11-18 NOTE — PROGRESS NOTES
Prenatal Visit    Jaki Waters is a 19 y.o. female  at 34w0d IUP    The patient was seen and evaluated. She has no complaints today.  She reports positive fetal movements. She denies contractions, vaginal bleeding and leakage of fluid. Signs and symptoms of labor and pre-eclampsia were reviewed with the patient in detail. She is to report any of these if they occur. She currently denies any of these.    The problem list reflects the active issues addressed during today's visit    Vitals:     BP: 113/74  Weight - Scale: 56.7 kg (125 lb)  Pulse: 79  Patient Position: Sitting  Fundal Height (cm): 34 cm  Fetal HR: 145  Movement: Present     PHYSICAL:   General appearance: no apparent distress, alert and cooperative  HEENT: head atraumatic, normocephalic, trachea midline, moist mucous membranes   Neurologic: alert, oriented, normal speech   Lungs: no increased work of breathing,   Abdomen: soft, gravid, non-tender on palpation    Musculoskeletal: no gross abnormalities, range of motion appropriate for age   Psychiatric: mood appropriate, normal affect      Assessment & Plan:  Jaki Waters is a 19 y.o. female  at 34w0d IUP   - VSS     - Has not completed 3 hr GTT yet , states she has it scheduled to get completed soon. Reprinted slip for her     - Patient's K improved from 3.3 to 3.9 but she was seen in the ED on  and noted to be hypokalemic again at 3.1. Will repeat labs today, denies any recent episodes of emesis    - Continue taking prenatal vitamins QD    - Tdap vaccination: already received     - Influenza vaccination: declined    - Rhogam: is not indicated in this pregnancy     - RSV vaccination (32-36 weeks): The patient was counseled on benefits to her baby if she receives the Pfizer RSV vaccine (Abrysvo) during pregnancy. She was informed that this vaccination is FDA approved for use during pregnancy                  - COVID-19 vaccination:advise booster during pregnancy

## 2024-11-20 ENCOUNTER — ROUTINE PRENATAL (OUTPATIENT)
Dept: OBGYN CLINIC | Age: 19
End: 2024-11-20
Payer: COMMERCIAL

## 2024-11-20 ENCOUNTER — HOSPITAL ENCOUNTER (OUTPATIENT)
Age: 19
Setting detail: SPECIMEN
Discharge: HOME | End: 2024-11-20

## 2024-11-20 VITALS
HEART RATE: 79 BPM | BODY MASS INDEX: 22.86 KG/M2 | WEIGHT: 125 LBS | SYSTOLIC BLOOD PRESSURE: 113 MMHG | DIASTOLIC BLOOD PRESSURE: 74 MMHG

## 2024-11-20 DIAGNOSIS — O09.93 HIGH-RISK PREGNANCY IN THIRD TRIMESTER: Primary | ICD-10-CM

## 2024-11-20 DIAGNOSIS — E87.6 HYPOKALEMIA: ICD-10-CM

## 2024-11-20 DIAGNOSIS — O09.93 HIGH-RISK PREGNANCY IN THIRD TRIMESTER: ICD-10-CM

## 2024-11-20 DIAGNOSIS — Z3A.34 34 WEEKS GESTATION OF PREGNANCY: ICD-10-CM

## 2024-11-20 LAB
ANION GAP SERPL CALCULATED.3IONS-SCNC: 11 MMOL/L (ref 9–16)
CHLORIDE SERPL-SCNC: 106 MMOL/L (ref 98–107)
CO2 SERPL-SCNC: 20 MMOL/L (ref 20–31)
POTASSIUM SERPL-SCNC: 3.8 MMOL/L (ref 3.7–5.3)
SODIUM SERPL-SCNC: 137 MMOL/L (ref 136–145)

## 2024-11-20 PROCEDURE — 99213 OFFICE O/P EST LOW 20 MIN: CPT | Performed by: STUDENT IN AN ORGANIZED HEALTH CARE EDUCATION/TRAINING PROGRAM

## 2024-11-25 ENCOUNTER — HOSPITAL ENCOUNTER (OUTPATIENT)
Age: 19
Setting detail: SPECIMEN
Discharge: HOME | End: 2024-11-25

## 2024-11-25 DIAGNOSIS — O09.93 HIGH-RISK PREGNANCY IN THIRD TRIMESTER: ICD-10-CM

## 2024-11-25 DIAGNOSIS — O99.810 ABNORMAL GLUCOSE TOLERANCE IN PREGNANCY: ICD-10-CM

## 2024-11-25 PROBLEM — O24.410 DIET CONTROLLED GESTATIONAL DIABETES MELLITUS (GDM) IN THIRD TRIMESTER: Status: ACTIVE | Noted: 2024-11-25

## 2024-11-25 LAB
AMOUNT GLUCOSE GIVEN: 100 G
GLUCOSE 2H P 75 G GLC PO SERPL-MCNC: 78 MG/DL
GLUCOSE TOLERANCE TEST 1 HOUR: 189 MG/DL
GLUCOSE TOLERANCE TEST 2 HOUR: 177 MG/DL
GLUCOSE TOLERANCE TEST 3 HOUR: 150 MG/DL

## 2024-11-26 DIAGNOSIS — O24.410 DIET CONTROLLED GESTATIONAL DIABETES MELLITUS (GDM) IN THIRD TRIMESTER: Primary | ICD-10-CM

## 2024-11-26 DIAGNOSIS — Z3A.35 35 WEEKS GESTATION OF PREGNANCY: ICD-10-CM

## 2024-11-30 NOTE — PROGRESS NOTES
Jaki is a  @ 35w5d who presents for MARCELA visit.  She denies LOF, VB or Ctxs.  + FM.  She was recently diagnosed as GDM, but hasn't picked up her supplies yet.  She plans to do that tomorrow.  She does have some intermittent cramping vaginal and hip pain.  She denies any fevers/chills, SOB, cough, sore throat, loss of taste/smell or sick contacts.  Pt denies any HA, vision changes or RUQ pain.     O:  Vitals:    24 1006   BP: 124/77   Pulse: 73     Gen: NAD  Abd: soft, nontender, gravid  Ext:  no edema      BP: 124/77  Weight - Scale: 56.7 kg (125 lb)  Pulse: 73  Fundal Height (cm): 34 cm  Fetal HR: 140  Movement: Present    A/P:  Patient Active Problem List    Diagnosis Date Noted    Scoliosis of thoracolumbar spine 10/05/2022     Priority: Medium     Anesthesia Referral sent 24       Anorexia 10/05/2022     Priority: Medium    Diet controlled gestational diabetes mellitus (GDM) in third trimester 2024    Abnormal glucose tolerance in pregnancy 10/30/2024     Elevated 1 hr       Anxiety 10/23/2024    Breech presentation 2024     Repeat MFM Scan 10/16/24         - Discussed updated COVID precautions and policies. Reviewed updated visitor policy. Encouraged social distancing and appropriate hand washing/hygiene practices. Reviewed symptoms suspicious for COVID infection. Discussed that ACOG, SMFM, and the CDC recommend to not withold immunization in pregnant and breastfeeding women who meet criteria for receipt of the vaccine based on the ACIP recommended priority groups. All questions answered. Patient vocalized understanding.    - RSV vaccination (32-36 weeks): The patient was counseled on benefits to her baby if she receives the Pfizer RSV vaccine (Abrysvo) during pregnancy. She was informed that this vaccination is FDA approved for use during pregnancy. She will think about it and call local pharmacies       GBS swab obtained  Discussed s/sx that should prompt call to the

## 2024-12-02 ENCOUNTER — HOSPITAL ENCOUNTER (OUTPATIENT)
Age: 19
Setting detail: SPECIMEN
Discharge: HOME | End: 2024-12-02

## 2024-12-02 ENCOUNTER — ROUTINE PRENATAL (OUTPATIENT)
Dept: OBGYN CLINIC | Age: 19
End: 2024-12-02
Payer: COMMERCIAL

## 2024-12-02 VITALS
DIASTOLIC BLOOD PRESSURE: 77 MMHG | BODY MASS INDEX: 22.86 KG/M2 | SYSTOLIC BLOOD PRESSURE: 124 MMHG | HEART RATE: 73 BPM | WEIGHT: 125 LBS

## 2024-12-02 DIAGNOSIS — Z3A.35 35 WEEKS GESTATION OF PREGNANCY: Primary | ICD-10-CM

## 2024-12-02 DIAGNOSIS — Z3A.35 35 WEEKS GESTATION OF PREGNANCY: ICD-10-CM

## 2024-12-02 DIAGNOSIS — Z34.03 ENCOUNTER FOR SUPERVISION OF NORMAL FIRST PREGNANCY IN THIRD TRIMESTER: ICD-10-CM

## 2024-12-02 PROCEDURE — 99212 OFFICE O/P EST SF 10 MIN: CPT | Performed by: OBSTETRICS & GYNECOLOGY

## 2024-12-02 RX ORDER — LANCETS 30 GAUGE
1 EACH MISCELLANEOUS 4 TIMES DAILY
Qty: 600 EACH | Refills: 1 | Status: ON HOLD | OUTPATIENT
Start: 2024-12-02

## 2024-12-02 RX ORDER — GLUCOSAMINE HCL/CHONDROITIN SU 500-400 MG
CAPSULE ORAL
Qty: 420 STRIP | Refills: 1 | Status: ON HOLD | OUTPATIENT
Start: 2024-12-02

## 2024-12-02 RX ORDER — BLOOD-GLUCOSE METER
1 KIT MISCELLANEOUS DAILY
Qty: 1 KIT | Refills: 0 | Status: ON HOLD | OUTPATIENT
Start: 2024-12-02

## 2024-12-03 ENCOUNTER — ROUTINE PRENATAL (OUTPATIENT)
Dept: PERINATAL CARE | Age: 19
End: 2024-12-03
Payer: COMMERCIAL

## 2024-12-03 VITALS
BODY MASS INDEX: 23.55 KG/M2 | SYSTOLIC BLOOD PRESSURE: 108 MMHG | DIASTOLIC BLOOD PRESSURE: 66 MMHG | TEMPERATURE: 98 F | WEIGHT: 128 LBS | HEART RATE: 95 BPM | RESPIRATION RATE: 16 BRPM | HEIGHT: 62 IN

## 2024-12-03 DIAGNOSIS — O26.13 LOW WEIGHT GAIN DURING PREGNANCY IN THIRD TRIMESTER: ICD-10-CM

## 2024-12-03 DIAGNOSIS — Z3A.35 35 WEEKS GESTATION OF PREGNANCY: ICD-10-CM

## 2024-12-03 DIAGNOSIS — O24.419 GESTATIONAL DIABETES MELLITUS (GDM), ANTEPARTUM, GESTATIONAL DIABETES METHOD OF CONTROL UNSPECIFIED: Primary | ICD-10-CM

## 2024-12-03 DIAGNOSIS — O99.891 CURRENT MATERNAL CONDITION AFFECTING PREGNANCY: ICD-10-CM

## 2024-12-03 DIAGNOSIS — Z36.4 ULTRASOUND FOR ANTENATAL SCREENING FOR FETAL GROWTH RESTRICTION: ICD-10-CM

## 2024-12-03 PROCEDURE — 76816 OB US FOLLOW-UP PER FETUS: CPT | Performed by: OBSTETRICS & GYNECOLOGY

## 2024-12-03 PROCEDURE — 99243 OFF/OP CNSLTJ NEW/EST LOW 30: CPT | Performed by: OBSTETRICS & GYNECOLOGY

## 2024-12-03 PROCEDURE — 76819 FETAL BIOPHYS PROFIL W/O NST: CPT | Performed by: OBSTETRICS & GYNECOLOGY

## 2024-12-03 NOTE — PROGRESS NOTES
Jaki Waters, was evaluated through a synchronous (real-time) audio-video encounter. The patient (or guardian if applicable) is aware that this is a billable service, which includes applicable co-pays. This Virtual Visit was conducted with patient's (and/or legal guardian's) consent. Patient identification was verified, and a caregiver was present when appropriate.   The patient was located at Facility ([facility]): [address] [place] [id number]  Provider was located at Other: [place].  Confirm you are appropriately licensed, registered, or certified to deliver care in the state where the patient is located as indicated above. If you are not or unsure, please re-schedule the visit: Yes, I confirm.      Total time spent for this encounter:  approximately 30 minutes (see dictation).    --Mathieu Villavicencio MD on 12/3/2024 at 9:21 AM    An electronic signature was used to authenticate this note.

## 2024-12-05 LAB
MICROORGANISM SPEC CULT: NORMAL
SERVICE CMNT-IMP: NORMAL
SPECIMEN DESCRIPTION: NORMAL

## 2024-12-08 ENCOUNTER — HOSPITAL ENCOUNTER (OUTPATIENT)
Age: 19
Setting detail: OBSERVATION
Discharge: HOME | End: 2024-12-09
Attending: STUDENT IN AN ORGANIZED HEALTH CARE EDUCATION/TRAINING PROGRAM | Admitting: STUDENT IN AN ORGANIZED HEALTH CARE EDUCATION/TRAINING PROGRAM
Payer: COMMERCIAL

## 2024-12-08 PROBLEM — Z3A.36 36 WEEKS GESTATION OF PREGNANCY: Status: ACTIVE | Noted: 2024-12-08

## 2024-12-08 LAB
ALBUMIN SERPL-MCNC: 3.8 G/DL (ref 3.5–5.2)
ALBUMIN/GLOB SERPL: 1.5 {RATIO} (ref 1–2.5)
ALP SERPL-CCNC: 152 U/L (ref 35–104)
ALT SERPL-CCNC: 10 U/L (ref 10–35)
ANION GAP SERPL CALCULATED.3IONS-SCNC: 13 MMOL/L (ref 9–16)
AST SERPL-CCNC: 24 U/L (ref 10–35)
B-OH-BUTYR SERPL-MCNC: 0.18 MMOL/L (ref 0.02–0.27)
BACTERIA URNS QL MICRO: ABNORMAL
BASOPHILS # BLD: <0.03 K/UL (ref 0–0.2)
BASOPHILS NFR BLD: 0 % (ref 0–2)
BILIRUB SERPL-MCNC: 0.6 MG/DL (ref 0–1.2)
BILIRUB UR QL STRIP: NEGATIVE
BUN SERPL-MCNC: 4 MG/DL (ref 6–20)
CALCIUM SERPL-MCNC: 8.8 MG/DL (ref 8.6–10.4)
CANDIDA SPECIES: NEGATIVE
CASTS #/AREA URNS LPF: ABNORMAL /LPF (ref 0–8)
CHLORIDE SERPL-SCNC: 105 MMOL/L (ref 98–107)
CLARITY UR: CLEAR
CO2 SERPL-SCNC: 19 MMOL/L (ref 20–31)
COLOR UR: YELLOW
CREAT SERPL-MCNC: 0.5 MG/DL (ref 0.6–0.9)
CREAT UR-MCNC: 42.9 MG/DL (ref 28–217)
EOSINOPHIL # BLD: 0.04 K/UL (ref 0–0.44)
EOSINOPHILS RELATIVE PERCENT: 1 % (ref 1–4)
EPI CELLS #/AREA URNS HPF: ABNORMAL /HPF (ref 0–5)
ERYTHROCYTE [DISTWIDTH] IN BLOOD BY AUTOMATED COUNT: 13.4 % (ref 11.8–14.4)
GARDNERELLA VAGINALIS: NEGATIVE
GFR, ESTIMATED: >90 ML/MIN/1.73M2
GLUCOSE SERPL-MCNC: 88 MG/DL (ref 74–99)
GLUCOSE UR STRIP-MCNC: NEGATIVE MG/DL
HCT VFR BLD AUTO: 39 % (ref 36.3–47.1)
HGB BLD-MCNC: 12.9 G/DL (ref 11.9–15.1)
HGB UR QL STRIP.AUTO: NEGATIVE
IMM GRANULOCYTES # BLD AUTO: 0.09 K/UL (ref 0–0.3)
IMM GRANULOCYTES NFR BLD: 1 %
KETONES UR STRIP-MCNC: ABNORMAL MG/DL
LEUKOCYTE ESTERASE UR QL STRIP: NEGATIVE
LYMPHOCYTES NFR BLD: 1.12 K/UL (ref 1.2–5.2)
LYMPHOCYTES RELATIVE PERCENT: 16 % (ref 25–45)
MCH RBC QN AUTO: 31.2 PG (ref 25.2–33.5)
MCHC RBC AUTO-ENTMCNC: 33.1 G/DL (ref 28.4–34.8)
MCV RBC AUTO: 94.4 FL (ref 82.6–102.9)
MONOCYTES NFR BLD: 0.55 K/UL (ref 0.1–1.4)
MONOCYTES NFR BLD: 8 % (ref 2–8)
NEUTROPHILS NFR BLD: 74 % (ref 34–64)
NEUTS SEG NFR BLD: 5.36 K/UL (ref 1.8–8)
NITRITE UR QL STRIP: NEGATIVE
NRBC BLD-RTO: 0 PER 100 WBC
PH UR STRIP: 7.5 [PH] (ref 5–8)
PLATELET # BLD AUTO: 164 K/UL (ref 138–453)
PMV BLD AUTO: 10.1 FL (ref 8.1–13.5)
POTASSIUM SERPL-SCNC: 2.7 MMOL/L (ref 3.7–5.3)
PROT SERPL-MCNC: 6.4 G/DL (ref 6.6–8.7)
PROT UR STRIP-MCNC: NEGATIVE MG/DL
RBC # BLD AUTO: 4.13 M/UL (ref 3.95–5.11)
RBC #/AREA URNS HPF: ABNORMAL /HPF (ref 0–4)
SODIUM SERPL-SCNC: 137 MMOL/L (ref 136–145)
SOURCE: NORMAL
SP GR UR STRIP: 1.01 (ref 1–1.03)
TOTAL PROTEIN, URINE: 7 MG/DL
TRICHOMONAS: NEGATIVE
URINE TOTAL PROTEIN CREATININE RATIO: 0.16 (ref 0–0.2)
UROBILINOGEN UR STRIP-ACNC: NORMAL EU/DL (ref 0–1)
WBC #/AREA URNS HPF: ABNORMAL /HPF (ref 0–5)
WBC OTHER # BLD: 7.2 K/UL (ref 4.5–13.5)

## 2024-12-08 PROCEDURE — 81001 URINALYSIS AUTO W/SCOPE: CPT

## 2024-12-08 PROCEDURE — 6360000002 HC RX W HCPCS

## 2024-12-08 PROCEDURE — 6370000000 HC RX 637 (ALT 250 FOR IP)

## 2024-12-08 PROCEDURE — 87591 N.GONORRHOEAE DNA AMP PROB: CPT

## 2024-12-08 PROCEDURE — 87510 GARDNER VAG DNA DIR PROBE: CPT

## 2024-12-08 PROCEDURE — 87660 TRICHOMONAS VAGIN DIR PROBE: CPT

## 2024-12-08 PROCEDURE — 82570 ASSAY OF URINE CREATININE: CPT

## 2024-12-08 PROCEDURE — 2580000003 HC RX 258

## 2024-12-08 PROCEDURE — 82010 KETONE BODYS QUAN: CPT

## 2024-12-08 PROCEDURE — 84156 ASSAY OF PROTEIN URINE: CPT

## 2024-12-08 PROCEDURE — 96365 THER/PROPH/DIAG IV INF INIT: CPT

## 2024-12-08 PROCEDURE — 85025 COMPLETE CBC W/AUTO DIFF WBC: CPT

## 2024-12-08 PROCEDURE — 87086 URINE CULTURE/COLONY COUNT: CPT

## 2024-12-08 PROCEDURE — 2500000003 HC RX 250 WO HCPCS

## 2024-12-08 PROCEDURE — 87480 CANDIDA DNA DIR PROBE: CPT

## 2024-12-08 PROCEDURE — 80053 COMPREHEN METABOLIC PANEL: CPT

## 2024-12-08 PROCEDURE — 96366 THER/PROPH/DIAG IV INF ADDON: CPT

## 2024-12-08 PROCEDURE — 96376 TX/PRO/DX INJ SAME DRUG ADON: CPT

## 2024-12-08 PROCEDURE — 36415 COLL VENOUS BLD VENIPUNCTURE: CPT

## 2024-12-08 PROCEDURE — 96375 TX/PRO/DX INJ NEW DRUG ADDON: CPT

## 2024-12-08 PROCEDURE — 87491 CHLMYD TRACH DNA AMP PROBE: CPT

## 2024-12-08 PROCEDURE — 96361 HYDRATE IV INFUSION ADD-ON: CPT

## 2024-12-08 RX ORDER — MORPHINE SULFATE 10 MG/ML
10 INJECTION INTRAVENOUS ONCE
Status: DISCONTINUED | OUTPATIENT
Start: 2024-12-08 | End: 2024-12-08

## 2024-12-08 RX ORDER — DIPHENHYDRAMINE HYDROCHLORIDE 50 MG/ML
25 INJECTION INTRAMUSCULAR; INTRAVENOUS ONCE
Status: COMPLETED | OUTPATIENT
Start: 2024-12-08 | End: 2024-12-08

## 2024-12-08 RX ORDER — LIDOCAINE 4 G/G
1 PATCH TOPICAL DAILY
Status: DISCONTINUED | OUTPATIENT
Start: 2024-12-09 | End: 2024-12-08

## 2024-12-08 RX ORDER — POTASSIUM CHLORIDE 1500 MG/1
40 TABLET, EXTENDED RELEASE ORAL PRN
Status: DISCONTINUED | OUTPATIENT
Start: 2024-12-08 | End: 2024-12-09 | Stop reason: HOSPADM

## 2024-12-08 RX ORDER — ONDANSETRON 2 MG/ML
4 INJECTION INTRAMUSCULAR; INTRAVENOUS EVERY 6 HOURS PRN
Status: DISCONTINUED | OUTPATIENT
Start: 2024-12-08 | End: 2024-12-09

## 2024-12-08 RX ORDER — 0.9 % SODIUM CHLORIDE 0.9 %
1000 INTRAVENOUS SOLUTION INTRAVENOUS ONCE
Status: COMPLETED | OUTPATIENT
Start: 2024-12-08 | End: 2024-12-08

## 2024-12-08 RX ORDER — POTASSIUM CHLORIDE 7.45 MG/ML
10 INJECTION INTRAVENOUS PRN
Status: DISCONTINUED | OUTPATIENT
Start: 2024-12-08 | End: 2024-12-09 | Stop reason: HOSPADM

## 2024-12-08 RX ORDER — PROCHLORPERAZINE EDISYLATE 5 MG/ML
10 INJECTION INTRAMUSCULAR; INTRAVENOUS ONCE
Status: DISCONTINUED | OUTPATIENT
Start: 2024-12-08 | End: 2024-12-08

## 2024-12-08 RX ORDER — CYCLOBENZAPRINE HCL 10 MG
10 TABLET ORAL ONCE
Status: COMPLETED | OUTPATIENT
Start: 2024-12-08 | End: 2024-12-08

## 2024-12-08 RX ORDER — ACETAMINOPHEN 500 MG
1000 TABLET ORAL EVERY 8 HOURS PRN
Status: DISCONTINUED | OUTPATIENT
Start: 2024-12-08 | End: 2024-12-09 | Stop reason: HOSPADM

## 2024-12-08 RX ORDER — LIDOCAINE 4 G/G
1 PATCH TOPICAL DAILY
Status: DISCONTINUED | OUTPATIENT
Start: 2024-12-08 | End: 2024-12-09 | Stop reason: HOSPADM

## 2024-12-08 RX ORDER — DIPHENHYDRAMINE HYDROCHLORIDE 50 MG/ML
25 INJECTION INTRAMUSCULAR; INTRAVENOUS EVERY 6 HOURS PRN
Status: DISCONTINUED | OUTPATIENT
Start: 2024-12-08 | End: 2024-12-09 | Stop reason: HOSPADM

## 2024-12-08 RX ORDER — PROCHLORPERAZINE EDISYLATE 5 MG/ML
10 INJECTION INTRAMUSCULAR; INTRAVENOUS ONCE
Status: COMPLETED | OUTPATIENT
Start: 2024-12-08 | End: 2024-12-08

## 2024-12-08 RX ADMIN — POTASSIUM CHLORIDE 10 MEQ: 7.46 INJECTION, SOLUTION INTRAVENOUS at 21:23

## 2024-12-08 RX ADMIN — DIPHENHYDRAMINE HYDROCHLORIDE 25 MG: 50 INJECTION INTRAMUSCULAR; INTRAVENOUS at 21:58

## 2024-12-08 RX ADMIN — ONDANSETRON 4 MG: 2 INJECTION INTRAMUSCULAR; INTRAVENOUS at 20:38

## 2024-12-08 RX ADMIN — POTASSIUM CHLORIDE 10 MEQ: 7.46 INJECTION, SOLUTION INTRAVENOUS at 23:08

## 2024-12-08 RX ADMIN — SODIUM CHLORIDE 1000 ML: 9 INJECTION, SOLUTION INTRAVENOUS at 20:33

## 2024-12-08 RX ADMIN — ACETAMINOPHEN 1000 MG: 500 TABLET ORAL at 20:34

## 2024-12-08 RX ADMIN — CYCLOBENZAPRINE 10 MG: 10 TABLET, FILM COATED ORAL at 22:03

## 2024-12-08 RX ADMIN — SODIUM CHLORIDE, PRESERVATIVE FREE 20 MG: 5 INJECTION INTRAVENOUS at 21:54

## 2024-12-08 RX ADMIN — PROCHLORPERAZINE EDISYLATE 10 MG: 5 INJECTION INTRAMUSCULAR; INTRAVENOUS at 23:17

## 2024-12-08 ASSESSMENT — PAIN DESCRIPTION - LOCATION: LOCATION: BACK

## 2024-12-08 ASSESSMENT — PAIN SCALES - GENERAL: PAINLEVEL_OUTOF10: 6

## 2024-12-08 NOTE — FLOWSHEET NOTE
Patient admitted to triage 2 from registration ambulatory.  Here for contractions and c/o N/V. Denies ROM or vaginal bleeding. Denies fever/chills.  Relates of + fetal movement.  Assisted into bed, Siderails up x2.  Call light in reach.  Bed in low position.  Oriented to room, surroundings, call system and plan of care.  Patient verbalizes understanding.     EFM applied, VS WNL

## 2024-12-09 VITALS
DIASTOLIC BLOOD PRESSURE: 55 MMHG | OXYGEN SATURATION: 99 % | HEART RATE: 65 BPM | RESPIRATION RATE: 16 BRPM | TEMPERATURE: 98.1 F | SYSTOLIC BLOOD PRESSURE: 106 MMHG

## 2024-12-09 LAB
ALBUMIN SERPL-MCNC: 2.9 G/DL (ref 3.5–5.2)
ALBUMIN SERPL-MCNC: 3.1 G/DL (ref 3.5–5.2)
ALBUMIN/GLOB SERPL: 1.5 {RATIO} (ref 1–2.5)
ALBUMIN/GLOB SERPL: 1.6 {RATIO} (ref 1–2.5)
ALP SERPL-CCNC: 116 U/L (ref 35–104)
ALP SERPL-CCNC: 123 U/L (ref 35–104)
ALT SERPL-CCNC: 8 U/L (ref 10–35)
ALT SERPL-CCNC: 8 U/L (ref 10–35)
ANION GAP SERPL CALCULATED.3IONS-SCNC: 12 MMOL/L (ref 9–16)
ANION GAP SERPL CALCULATED.3IONS-SCNC: 9 MMOL/L (ref 9–16)
AST SERPL-CCNC: 17 U/L (ref 10–35)
AST SERPL-CCNC: 20 U/L (ref 10–35)
BILIRUB SERPL-MCNC: 0.4 MG/DL (ref 0–1.2)
BILIRUB SERPL-MCNC: 0.5 MG/DL (ref 0–1.2)
BUN SERPL-MCNC: 3 MG/DL (ref 6–20)
BUN SERPL-MCNC: 3 MG/DL (ref 6–20)
CALCIUM SERPL-MCNC: 8.3 MG/DL (ref 8.6–10.4)
CALCIUM SERPL-MCNC: 8.4 MG/DL (ref 8.6–10.4)
CHLORIDE SERPL-SCNC: 111 MMOL/L (ref 98–107)
CHLORIDE SERPL-SCNC: 112 MMOL/L (ref 98–107)
CO2 SERPL-SCNC: 17 MMOL/L (ref 20–31)
CO2 SERPL-SCNC: 19 MMOL/L (ref 20–31)
CREAT SERPL-MCNC: 0.5 MG/DL (ref 0.6–0.9)
CREAT SERPL-MCNC: 0.6 MG/DL (ref 0.6–0.9)
GFR, ESTIMATED: >90 ML/MIN/1.73M2
GFR, ESTIMATED: >90 ML/MIN/1.73M2
GLUCOSE SERPL-MCNC: 67 MG/DL (ref 74–99)
GLUCOSE SERPL-MCNC: 77 MG/DL (ref 74–99)
MAGNESIUM SERPL-MCNC: 1.7 MG/DL (ref 1.6–2.6)
MAGNESIUM SERPL-MCNC: 1.8 MG/DL (ref 1.6–2.6)
MICROORGANISM SPEC CULT: NORMAL
POTASSIUM SERPL-SCNC: 3.4 MMOL/L (ref 3.7–5.3)
POTASSIUM SERPL-SCNC: 3.7 MMOL/L (ref 3.7–5.3)
PROT SERPL-MCNC: 4.8 G/DL (ref 6.6–8.7)
PROT SERPL-MCNC: 5.1 G/DL (ref 6.6–8.7)
SERVICE CMNT-IMP: NORMAL
SODIUM SERPL-SCNC: 140 MMOL/L (ref 136–145)
SODIUM SERPL-SCNC: 140 MMOL/L (ref 136–145)
SPECIMEN DESCRIPTION: NORMAL

## 2024-12-09 PROCEDURE — 80053 COMPREHEN METABOLIC PANEL: CPT

## 2024-12-09 PROCEDURE — 36415 COLL VENOUS BLD VENIPUNCTURE: CPT

## 2024-12-09 PROCEDURE — 96366 THER/PROPH/DIAG IV INF ADDON: CPT

## 2024-12-09 PROCEDURE — 2500000003 HC RX 250 WO HCPCS

## 2024-12-09 PROCEDURE — 6360000002 HC RX W HCPCS

## 2024-12-09 PROCEDURE — 99215 OFFICE O/P EST HI 40 MIN: CPT

## 2024-12-09 PROCEDURE — 96376 TX/PRO/DX INJ SAME DRUG ADON: CPT

## 2024-12-09 PROCEDURE — 96361 HYDRATE IV INFUSION ADD-ON: CPT

## 2024-12-09 PROCEDURE — 83735 ASSAY OF MAGNESIUM: CPT

## 2024-12-09 PROCEDURE — G0378 HOSPITAL OBSERVATION PER HR: HCPCS

## 2024-12-09 PROCEDURE — 2580000003 HC RX 258

## 2024-12-09 RX ORDER — ONDANSETRON 2 MG/ML
4 INJECTION INTRAMUSCULAR; INTRAVENOUS EVERY 4 HOURS
Status: DISCONTINUED | OUTPATIENT
Start: 2024-12-09 | End: 2024-12-09 | Stop reason: HOSPADM

## 2024-12-09 RX ORDER — PROCHLORPERAZINE EDISYLATE 5 MG/ML
10 INJECTION INTRAMUSCULAR; INTRAVENOUS EVERY 6 HOURS
Status: DISCONTINUED | OUTPATIENT
Start: 2024-12-09 | End: 2024-12-09 | Stop reason: HOSPADM

## 2024-12-09 RX ORDER — SODIUM CHLORIDE 9 MG/ML
INJECTION, SOLUTION INTRAVENOUS CONTINUOUS
Status: DISCONTINUED | OUTPATIENT
Start: 2024-12-09 | End: 2024-12-09 | Stop reason: HOSPADM

## 2024-12-09 RX ADMIN — ONDANSETRON 4 MG: 2 INJECTION INTRAMUSCULAR; INTRAVENOUS at 07:48

## 2024-12-09 RX ADMIN — POTASSIUM CHLORIDE 10 MEQ: 7.46 INJECTION, SOLUTION INTRAVENOUS at 07:46

## 2024-12-09 RX ADMIN — POTASSIUM CHLORIDE 10 MEQ: 7.46 INJECTION, SOLUTION INTRAVENOUS at 09:18

## 2024-12-09 RX ADMIN — SODIUM CHLORIDE: 9 INJECTION, SOLUTION INTRAVENOUS at 04:18

## 2024-12-09 RX ADMIN — POTASSIUM CHLORIDE 10 MEQ: 7.46 INJECTION, SOLUTION INTRAVENOUS at 06:06

## 2024-12-09 RX ADMIN — ONDANSETRON 4 MG: 2 INJECTION INTRAMUSCULAR; INTRAVENOUS at 03:29

## 2024-12-09 RX ADMIN — SODIUM CHLORIDE, PRESERVATIVE FREE 20 MG: 5 INJECTION INTRAVENOUS at 09:15

## 2024-12-09 RX ADMIN — POTASSIUM CHLORIDE 10 MEQ: 7.46 INJECTION, SOLUTION INTRAVENOUS at 04:19

## 2024-12-09 RX ADMIN — PROCHLORPERAZINE EDISYLATE 10 MG: 5 INJECTION INTRAMUSCULAR; INTRAVENOUS at 06:06

## 2024-12-09 RX ADMIN — POTASSIUM CHLORIDE 10 MEQ: 7.46 INJECTION, SOLUTION INTRAVENOUS at 01:34

## 2024-12-09 NOTE — CARE COORDINATION
ANTEPARTUM NOTE    36 weeks gestation of pregnancy [Z3A.36]    Jaki was admitted to L&D on 12/8/2024 for N/V, contractions and decreased fetal movement @ 36 4/7    OB GYN Provider: Dr Martinez    Will meet with patient after delivery to verify name/address/phone/insurance and discuss discharge planning.     Anticipate DC home 2 nights after vaginal delivery or 4 nights after C/S delivery as long as hemodynamically stable.

## 2024-12-09 NOTE — FLOWSHEET NOTE
RN at bedside. Pt actively vomiting in bathroom and requesting to shower at this time. Residents updated and ok with plan. RN will replace EFM once pt out of shower

## 2024-12-09 NOTE — PROGRESS NOTES
Obstetric/Gynecology Resident Interval Note    Writer to bedside for chest burning, arm pain. Patient reports pain in her left arm where IV is placed. Repots worsening in substernal burning that radiates to the back of her neck. Denies chest pain, chest pressure, chest tightness, shortness of breath, palpitations, trouble breathing, trouble swallowing. Endorses associated nausea. VSS. No conversational dyspnea or increased work of breathing. No signs of urticaria, erythema, or angioedema. Potassium replacement was recently initiated; patient reports symptoms started after initiation of potassium. IV potassium replacement immediately stopped; IV benadryl and Pepcid ordered. Low clinical concern for anaphylaxis at this time. Will monitor for symptomatic improvement. If symptoms improve, will consider restarting IV potassium replacement at a slower rate. Inpatient pharmacy contacted who is in agreement with this plan.       Eli King MD  OB/GYN Resident, PGY1  [place], [place]  12/8/2024, 9:52 PM

## 2024-12-09 NOTE — H&P
discussed in detail with the patient. Admission, and post admission procedures and expectations were discussed in detail. All questions were answered.    Attending's Name: Dr. Gaston King MD  Ob/Gyn Resident  12/8/2024, 7:11 PM

## 2024-12-09 NOTE — CARE COORDINATION
CASE MANAGEMENT ANTEPARTUM TRANSITIONAL CARE PLAN    36 weeks gestation of pregnancy [Z3A.36]    OB Provider: Dr Martinez    Writer met w/ Jaki and FEDE Armando at her bedside to discuss DCP. She was admitted for N/V, cramping and decreased fetal movement.  She is also Hypokalemic.      Writer verified address/phone number correct on facesheet. She states she lives with Tr. She denied barriers with transportation home, to doctor's appointments or with paying for medications upon discharge home.     [employer] insurance correct.     She states she is having a boy and they would like to call him Сергей     DME: no  HOME CARE: no    Anticipate DC once N/V is resolved, fetal status is reassuring and Potassium is replaced without incident.      Case Management will continue to follow until DC    Readmission Risk              Risk of Unplanned Readmission:  0

## 2024-12-09 NOTE — FLOWSHEET NOTE
K+ rate increased to 40ml/hr per Dr Sena. RN will cont to monitor pt and any signs for adverse reaction

## 2024-12-09 NOTE — DISCHARGE SUMMARY
Obstetric Discharge Summary  [facility]      Patient Name: Jaki Waters  Patient : 2005  Primary Care Physician: No primary care provider on file.  Admit Date: 2024    Principal Diagnosis: IUP at 36w4d, admitted for Nausea/Vomiting, Hypokalemia    Other Diagnosis:   36 weeks gestation of pregnancy [Z3A.36]  Patient Active Problem List   Diagnosis    Scoliosis of thoracolumbar spine    Anorexia    Breech presentation    Anxiety    Abnormal glucose tolerance in pregnancy    Diet controlled gestational diabetes mellitus (GDM) in third trimester    36 weeks gestation of pregnancy       Infection: No  Hospital Acquired: No    Surgical Operations & Procedures: None    Consultations: None    Pertinent Findings & Procedures:   Jaki Waters is a 19 y.o. female  at 36w4d admitted for nausea/vomiting, contractions, hypokalemia (K = 2.7); received IV potassium replacement, IVF, Tylenol, Flexeril, Lidocaine patch, antiemetics (Zofran, Compazine), Pepcid Discharge instructions reviewed and questions answered.    Course of patient: Complicated by Hypokalemia  HD#0: received K replacement overnight  HD#1: labs resolved. K 3.7, tolerated PO.     Discharge to: Home    Readmission planned: No    Recommendations on Discharge:     Medications:     Medication List        CONTINUE taking these medications      ferrous gluconate 324 (38 Fe) MG tablet  Commonly known as: FERGON  Take 1 tablet by mouth daily (with breakfast)     glucose monitoring kit  1 kit by Does not apply route daily     Lancets Misc  1 each by Does not apply route 4 times daily     ondansetron 4 MG disintegrating tablet  Commonly known as: ZOFRAN-ODT  Take 1 tablet by mouth every 8 hours as needed for Nausea or Vomiting     Prenatal Vitamin 27-0.8 MG Tabs  Take 1 tablet by mouth daily            ASK your doctor about these medications      blood glucose test strips  Test 4 times a day & as needed for symptoms of

## 2024-12-09 NOTE — PROGRESS NOTES
OB/GYN PROGRESS NOTE    Jaki Waters is a 19 y.o. female  at 36w5d, Hospital Day: 2    Subjective:   Patient has been seen and examined.  Patient is resting comfortably, reports improvement in nausea and vomiting, last episode of emesis around 0. Reports she was able to tolerate potato chips.Patient denies any vaginal discharge and any urinary complaints.The patient reports fetal movement is present, denies contractions, denies loss of fluid, denies vaginal bleeding. Patient denies headache, vision changes, fever, chills, shortness of breath, chest pain, RUQ pain, abdominal pain, diarrhea, change in color/amount/odor of vaginal discharge, dysuria or, hematuria.     Objective:   Vitals:  Vitals:    24 1835 24 2146 24 2204 24 0330   BP: 111/74 125/66 114/64 (!) 108/51   Pulse: 90 83 83 67   Resp: 16 18 18 17   Temp: 97.8 °F (36.6 °C)  98.6 °F (37 °C) 97.8 °F (36.6 °C)   TempSrc: Oral  Oral Oral   SpO2: 97% 100% 100% 99%         FHT: 120, moderate variability, accelerations present, rare prolonged decelerations present  Contractions: irregular, every 3-8 minutes    Physical Exam:  General appearance:  no apparent distress, alert, and cooperative  HEENT: head atraumatic, normocephalic, moist mucous membranes, trachea midline  Neurologic:  alert, oriented, normal speech, no focal findings or movement disorder noted  Lungs:  No increased work of breathing  Heart:  regular rate   Abdomen:  soft, gravid, non-tender, and no rebound, guarding, or rigidity  Extremities:  no calf tenderness, non edematous  Musculoskeletal: Gross strength equal and intact throughout, no gross abnormalities, range of motion normal in hips, knees, shoulders and spine  Psychiatric: Mood appropriate, normal affect     Assessment/Plan:  Jaki Waters is a 19 y.o. female  at 36w5d IUP   - Rh positive/ Rubella immune/ GBS negative   - No indication for GBS prophylaxis    - VSS   - cEFM/toco reveals

## 2024-12-09 NOTE — PROGRESS NOTES
Obstetric/Gynecology Resident Interval Note    Writer to bedside to discuss labs.    CMP repeat potassium 3.7 and wnl. The patient has tolerated her oral PO challenge without symptoms and feels well after. Given labs have normalized and patient's symptoms have resolved, feel patient is stable for discharge.     Patient will be discharged home. Patient counseled on  labor precautions, pre-eclampsia signs and symptoms, PO hydration, and fetal kick counts. Patient was instructed to return to the hospital if experiencing leakage of fluid, vaginal bleeding or decreased fetal movement. Also advised patient to come to the hospital if experiencing unrelenting headache, vision changes, shortness of breath, RUQ pain, nausea/vomiting or worsening peripheral edema.  All questions and concerns were addressed and patient expressed understanding. Patient advised to follow up in the office for next appointment on 12/10/24.    Tracing reviewed and Category 1.     Dr. Martinez updated and in agreement with plan.        Belén Carter DO  OB/GYN Resident, PGY2  [place], [place]  2024, 11:56 AM

## 2024-12-09 NOTE — FLOWSHEET NOTE
Patients boyfriend brought patient food back.  Patient PO challenged on her own and denies any upset stomach or N/V at this time.  Patient offered crackers and declined.  Will update residents on patient status.

## 2024-12-09 NOTE — FLOWSHEET NOTE
[id number] Strips reviewed with residents due to multiple potential prolong decelerations. Many baseline shifts noted from the 120's-150's.   0103- Prolong decel noted   0218- Prolong decel noted

## 2024-12-09 NOTE — FLOWSHEET NOTE
IV potassium restarted at 2308 @ 20mL/hr due to pt not tolerating original rate. Pt on cont tele. Dr King updated on above.

## 2024-12-10 LAB
C TRACH DNA SPEC QL PROBE+SIG AMP: NEGATIVE
N GONORRHOEA DNA SPEC QL PROBE+SIG AMP: NEGATIVE
SPECIMEN DESCRIPTION: NORMAL

## 2024-12-16 ENCOUNTER — ROUTINE PRENATAL (OUTPATIENT)
Dept: OBGYN CLINIC | Age: 19
End: 2024-12-16
Payer: COMMERCIAL

## 2024-12-16 VITALS
SYSTOLIC BLOOD PRESSURE: 120 MMHG | BODY MASS INDEX: 23.45 KG/M2 | WEIGHT: 127.4 LBS | HEIGHT: 62 IN | DIASTOLIC BLOOD PRESSURE: 80 MMHG

## 2024-12-16 DIAGNOSIS — O24.410 DIET CONTROLLED GESTATIONAL DIABETES MELLITUS (GDM) IN THIRD TRIMESTER: Primary | ICD-10-CM

## 2024-12-16 DIAGNOSIS — Z3A.37 37 WEEKS GESTATION OF PREGNANCY: ICD-10-CM

## 2024-12-16 DIAGNOSIS — O09.93 SUPERVISION OF HIGH RISK PREGNANCY IN THIRD TRIMESTER: ICD-10-CM

## 2024-12-16 PROBLEM — O99.810 ABNORMAL GLUCOSE TOLERANCE IN PREGNANCY: Status: RESOLVED | Noted: 2024-10-30 | Resolved: 2024-12-16

## 2024-12-16 PROBLEM — Z3A.36 36 WEEKS GESTATION OF PREGNANCY: Status: RESOLVED | Noted: 2024-12-08 | Resolved: 2024-12-16

## 2024-12-16 PROCEDURE — 99213 OFFICE O/P EST LOW 20 MIN: CPT | Performed by: STUDENT IN AN ORGANIZED HEALTH CARE EDUCATION/TRAINING PROGRAM

## 2024-12-16 NOTE — PROGRESS NOTES
Prenatal Visit    Jaki Waters is a 19 y.o. female  at 37w5d    The patient was seen and evaluated. Reports positive fetal movements. She denies headache, vision changes, RUQ pain, contractions, vaginal bleeding and leakage of fluid.      The patient already received the T-Dap Vaccine (27-36 weeks) this pregnancy.   The patient declined the influenza vaccine this year.   The problem list reflects the active issues addressed during today's visit.    Allergies:  Patient has no known allergies.    Vitals:    BP: 120/80  Weight - Scale: 57.8 kg (127 lb 6.4 oz)  Patient Position: Sitting  Fundal Height (cm): 37 cm  Fetal HR: 150  Movement: Present     Physical Exam:  SVE: N/A    Labs:  Group Beta Strep collection was done.   Sensitivities for clindamycin and erythromycin were not ordered.  The patient was found to be GBS: negative    Assessment & Plan:  Jaki Waters is a 19 y.o. female  at 37w5d   - Discussed signs or symptoms of when to call office   - Discussed fetal movement parameters  - Next MFM appt on 24.    - Continue taking Prenatal Vitamin.   - Continue fasting and 2 hr PP. Managed by MFM. Not on insulin at this time.         Patient Active Problem List    Diagnosis Date Noted    Scoliosis of thoracolumbar spine 10/05/2022     Priority: Medium     Anesthesia Referral sent 24       Anorexia 10/05/2022     Priority: Medium    Diet controlled gestational diabetes mellitus (GDM) in third trimester 2024    Anxiety 10/23/2024    BREECH (RSLVD) 2024     Repeat MFM Scan 10/16/24  Cephalic 2024 in MFM         Return in about 1 week (around 2024) for DO Alvino Hope Ob/Gyn   2024, 12:01 PM

## 2024-12-19 ENCOUNTER — HOSPITAL ENCOUNTER (OUTPATIENT)
Age: 19
Discharge: HOME | End: 2024-12-19
Attending: OBSTETRICS & GYNECOLOGY | Admitting: OBSTETRICS & GYNECOLOGY
Payer: COMMERCIAL

## 2024-12-19 VITALS
RESPIRATION RATE: 18 BRPM | SYSTOLIC BLOOD PRESSURE: 122 MMHG | TEMPERATURE: 97.4 F | HEART RATE: 99 BPM | OXYGEN SATURATION: 100 % | DIASTOLIC BLOOD PRESSURE: 79 MMHG

## 2024-12-19 PROBLEM — Z3A.38 38 WEEKS GESTATION OF PREGNANCY: Status: ACTIVE | Noted: 2024-12-19

## 2024-12-19 LAB
ALBUMIN SERPL-MCNC: 3.3 G/DL (ref 3.5–5.2)
ALBUMIN/GLOB SERPL: 1.4 {RATIO} (ref 1–2.5)
ALP SERPL-CCNC: 143 U/L (ref 35–104)
ALT SERPL-CCNC: 11 U/L (ref 10–35)
ANION GAP SERPL CALCULATED.3IONS-SCNC: 11 MMOL/L (ref 9–16)
AST SERPL-CCNC: 19 U/L (ref 10–35)
B-OH-BUTYR SERPL-MCNC: 0.53 MMOL/L (ref 0.02–0.27)
BACTERIA URNS QL MICRO: ABNORMAL
BASOPHILS # BLD: <0.03 K/UL (ref 0–0.2)
BASOPHILS NFR BLD: 0 % (ref 0–2)
BILIRUB SERPL-MCNC: 0.4 MG/DL (ref 0–1.2)
BILIRUB UR QL STRIP: NEGATIVE
BUN SERPL-MCNC: 3 MG/DL (ref 6–20)
CALCIUM SERPL-MCNC: 8.5 MG/DL (ref 8.6–10.4)
CASTS #/AREA URNS LPF: ABNORMAL /LPF (ref 0–2)
CASTS #/AREA URNS LPF: ABNORMAL /LPF (ref 0–2)
CHLORIDE SERPL-SCNC: 109 MMOL/L (ref 98–107)
CLARITY UR: ABNORMAL
CO2 SERPL-SCNC: 18 MMOL/L (ref 20–31)
COLOR UR: YELLOW
CREAT SERPL-MCNC: 0.4 MG/DL (ref 0.6–0.9)
EOSINOPHIL # BLD: 0.05 K/UL (ref 0–0.44)
EOSINOPHILS RELATIVE PERCENT: 1 % (ref 1–4)
EPI CELLS #/AREA URNS HPF: ABNORMAL /HPF (ref 0–5)
ERYTHROCYTE [DISTWIDTH] IN BLOOD BY AUTOMATED COUNT: 13.2 % (ref 11.8–14.4)
GFR, ESTIMATED: >90 ML/MIN/1.73M2
GLUCOSE SERPL-MCNC: 67 MG/DL (ref 74–99)
GLUCOSE UR STRIP-MCNC: NEGATIVE MG/DL
HCT VFR BLD AUTO: 33.8 % (ref 36.3–47.1)
HGB BLD-MCNC: 11.2 G/DL (ref 11.9–15.1)
HGB UR QL STRIP.AUTO: NEGATIVE
IMM GRANULOCYTES # BLD AUTO: 0.09 K/UL (ref 0–0.3)
IMM GRANULOCYTES NFR BLD: 1 %
KETONES UR STRIP-MCNC: NEGATIVE MG/DL
LEUKOCYTE ESTERASE UR QL STRIP: ABNORMAL
LYMPHOCYTES NFR BLD: 1.4 K/UL (ref 1.2–5.2)
LYMPHOCYTES RELATIVE PERCENT: 18 % (ref 25–45)
MCH RBC QN AUTO: 30.5 PG (ref 25.2–33.5)
MCHC RBC AUTO-ENTMCNC: 33.1 G/DL (ref 28.4–34.8)
MCV RBC AUTO: 92.1 FL (ref 82.6–102.9)
MONOCYTES NFR BLD: 0.5 K/UL (ref 0.1–1.4)
MONOCYTES NFR BLD: 7 % (ref 2–8)
NEUTROPHILS NFR BLD: 73 % (ref 34–64)
NEUTS SEG NFR BLD: 5.58 K/UL (ref 1.8–8)
NITRITE UR QL STRIP: NEGATIVE
NRBC BLD-RTO: 0 PER 100 WBC
PH UR STRIP: 7.5 [PH] (ref 5–8)
PLATELET # BLD AUTO: 180 K/UL (ref 138–453)
PMV BLD AUTO: 10.4 FL (ref 8.1–13.5)
POTASSIUM SERPL-SCNC: 3.3 MMOL/L (ref 3.7–5.3)
PROT SERPL-MCNC: 5.6 G/DL (ref 6.6–8.7)
PROT UR STRIP-MCNC: NEGATIVE MG/DL
RBC # BLD AUTO: 3.67 M/UL (ref 3.95–5.11)
RBC #/AREA URNS HPF: ABNORMAL /HPF (ref 0–2)
SODIUM SERPL-SCNC: 138 MMOL/L (ref 136–145)
SP GR UR STRIP: 1 (ref 1–1.03)
UROBILINOGEN UR STRIP-ACNC: NORMAL EU/DL (ref 0–1)
WBC #/AREA URNS HPF: ABNORMAL /HPF (ref 0–5)
WBC OTHER # BLD: 7.6 K/UL (ref 4.5–13.5)

## 2024-12-19 PROCEDURE — 81001 URINALYSIS AUTO W/SCOPE: CPT

## 2024-12-19 PROCEDURE — 6370000000 HC RX 637 (ALT 250 FOR IP)

## 2024-12-19 PROCEDURE — 99214 OFFICE O/P EST MOD 30 MIN: CPT

## 2024-12-19 PROCEDURE — 85025 COMPLETE CBC W/AUTO DIFF WBC: CPT

## 2024-12-19 PROCEDURE — 82010 KETONE BODYS QUAN: CPT

## 2024-12-19 PROCEDURE — 80053 COMPREHEN METABOLIC PANEL: CPT

## 2024-12-19 PROCEDURE — 87086 URINE CULTURE/COLONY COUNT: CPT

## 2024-12-19 RX ORDER — ACETAMINOPHEN 500 MG
1000 TABLET ORAL ONCE
Status: COMPLETED | OUTPATIENT
Start: 2024-12-19 | End: 2024-12-19

## 2024-12-19 RX ORDER — POTASSIUM CHLORIDE 7.45 MG/ML
10 INJECTION INTRAVENOUS PRN
Status: DISCONTINUED | OUTPATIENT
Start: 2024-12-19 | End: 2024-12-19 | Stop reason: HOSPADM

## 2024-12-19 RX ORDER — CYCLOBENZAPRINE HCL 10 MG
10 TABLET ORAL ONCE
Status: COMPLETED | OUTPATIENT
Start: 2024-12-19 | End: 2024-12-19

## 2024-12-19 RX ORDER — ACETAMINOPHEN 500 MG
1000 TABLET ORAL EVERY 8 HOURS SCHEDULED
Status: DISCONTINUED | OUTPATIENT
Start: 2024-12-19 | End: 2024-12-19 | Stop reason: HOSPADM

## 2024-12-19 RX ORDER — FAMOTIDINE 20 MG/1
20 TABLET, FILM COATED ORAL ONCE
Status: COMPLETED | OUTPATIENT
Start: 2024-12-19 | End: 2024-12-19

## 2024-12-19 RX ORDER — CEPHALEXIN 500 MG/1
500 CAPSULE ORAL 4 TIMES DAILY
Qty: 14 CAPSULE | Refills: 0 | Status: SHIPPED | OUTPATIENT
Start: 2024-12-19 | End: 2024-12-26

## 2024-12-19 RX ORDER — POTASSIUM CHLORIDE 1500 MG/1
40 TABLET, EXTENDED RELEASE ORAL PRN
Status: DISCONTINUED | OUTPATIENT
Start: 2024-12-19 | End: 2024-12-19 | Stop reason: HOSPADM

## 2024-12-19 RX ORDER — ONDANSETRON 4 MG/1
4 TABLET, ORALLY DISINTEGRATING ORAL ONCE
Status: COMPLETED | OUTPATIENT
Start: 2024-12-19 | End: 2024-12-19

## 2024-12-19 RX ADMIN — CYCLOBENZAPRINE 10 MG: 10 TABLET, FILM COATED ORAL at 15:23

## 2024-12-19 RX ADMIN — POTASSIUM CHLORIDE 40 MEQ: 1500 TABLET, EXTENDED RELEASE ORAL at 16:51

## 2024-12-19 RX ADMIN — ONDANSETRON 4 MG: 4 TABLET, ORALLY DISINTEGRATING ORAL at 15:22

## 2024-12-19 RX ADMIN — ACETAMINOPHEN 1000 MG: 500 TABLET ORAL at 15:23

## 2024-12-19 RX ADMIN — FAMOTIDINE 20 MG: 20 TABLET, FILM COATED ORAL at 16:51

## 2024-12-19 ASSESSMENT — PAIN DESCRIPTION - LOCATION: LOCATION: ABDOMEN

## 2024-12-19 ASSESSMENT — PAIN DESCRIPTION - DESCRIPTORS: DESCRIPTORS: CRAMPING

## 2024-12-19 ASSESSMENT — PAIN SCALES - GENERAL: PAINLEVEL_OUTOF10: 6

## 2024-12-19 NOTE — H&P
edematous  Musculoskeletal: Gross strength equal and intact throughout, no gross abnormalities, range of motion normal in hips, knees, shoulders and spine, CVA tenderness: none  Psychiatric: Mood appropriate, normal affect   Rectal Exam: not indicated  Pelvic Exam:  Chaperone for Intimate Exam: Chaperone was present for entire exam, Chaperone Name: MARY Marr  Sterile Vaginal Exam:  Cervix: No cervical motion tenderness   Uterus: Is gravid, Normal size, shape, consistency and non-tender   Adnexa: Non-tender, no palpable masses  Cervix: closed dilated, 0 % effaced, -3 station, posterior position (out of 3 station), firm consistency      PRENATAL LAB RESULTS:  Blood Type/Rh: O pos  Antibody Screen: negative  Hemoglobin, Hematocrit, Platelets: 13.2/39.5/278  Rubella: immune  T. Pallidum, IgG: non-reactive  Hepatitis B Surface Antigen: non-reactive   Hepatitis C Antibody: non-reactive   HIV: non-reactive   Gonorrhea: negative  Chlamydia: negative  Urine culture: negative, date: 6/10/24    1 hour Glucose Tolerance Test: 132  3 hour Glucose Tolerance Test: Fasting 78/1 hour 189/2 hour 177/3 hour 150    Group B Strep: negative  Cystic Fibrosis Screen: negative  Sickle Cell Screen: negative  First Trimester Screen: not done  msAFP: negative  Non-Invasive Prenatal Testing: low risk for aneuploidy  Anatomy US: breech, anterior placenta, 3VC, male gender, normal anatomy    ASSESSMENT & PLAN:  Jaki Waters is a 19 y.o. female  at 38w1d IUP   - GBS negative / Rh positive / R immune   - No indication for GBS prophylaxis    Abdominal Pain/Back Pain with Nausea & Vomiting   - Patient reports abdominal pain and lower back pain over the last 24 hours as well as intermittent nausea and vomiting   - VSS, afebrile    - cEFM/TOCO cat 1   - CBC, CMP, BHB ordered   - Zofran x1    - Tylenol/Flexeril x1   - Lidocaine patch- declined as patient reports a history of a rash with it   - SVE: closed   - UA pending   - Abdomen  occasionally palpating hard likely having veronica gomes contractions, however will reassess SVE in 2 hours if patient desires and pending her symptoms and clinical status   - Patient given regular diet, will start with PO hydration and PO challenge patient if symptoms improve   - Continue to monitor closely    BMI 23    Patient Active Problem List    Diagnosis Date Noted    Scoliosis of thoracolumbar spine 10/05/2022     Priority: Medium     Anesthesia Referral sent 9/9/24       Anorexia 10/05/2022     Priority: Medium    38 weeks gestation of pregnancy 12/19/2024    Diet controlled gestational diabetes mellitus (GDM) in third trimester 11/25/2024    Anxiety 10/23/2024    BREECH (RSLVD) 09/09/2024     Repeat MFM Scan 10/16/24  Cephalic 12/2024 in MFM           Plan discussed with Dr. Bo, who is agreeable.     Steroids given this admission: No    Risks, benefits, alternatives and possible complications have been discussed in detail with the patient. Admission, and post admission procedures and expectations were discussed in detail. All questions were answered.    Attending's Name: Dr. Gaston Whitley DO  Ob/Gyn Resident  12/19/2024, 3:40 PM

## 2024-12-19 NOTE — PROGRESS NOTES
Ob Attending     Pt seen after PO challenge she was treated for nausea vomiting with hydration PO and antiemetics she feels much better. Her labs were remarkable for mild hypokalemia of 3.3 treated with potassium replacement and a BHB of 0.53. She requests discharge home as she is feeling better. She has a follow up appointment on Monday. Reviewed when to return if she is not improving at home or is not able to tolerate liquids. All questions answered.     FHR tracing reviewed at category 1 reactive    Vitals:    12/19/24 1451   BP: 122/79   Pulse: 99   Resp: 18   Temp: 97.4 °F (36.3 °C)   TempSrc: Oral   SpO2: 100%         Kenzie Bo MD

## 2024-12-19 NOTE — FLOWSHEET NOTE
Patient admitted to triage 1 with c/o of intermittent abdominal pain as well as back pain. Patient also states not being able to hold anything down x 2 days. No bleeding or LOF, +fm noted per patient. EFM applied with FHTs 155, abdomen palpating soft. Residents notified of patients arrival.

## 2024-12-20 LAB
MICROORGANISM SPEC CULT: NORMAL
SPECIMEN DESCRIPTION: NORMAL

## 2024-12-22 NOTE — PROGRESS NOTES
[facility] [place]  [address]  [address]  [place] [address]      Date: 2024      Chief complaint:  Chief Complaint   Patient presents with    Routine Prenatal Visit              Jaki lockhart 19 y.o. y.o. female G2P 0010 at 38 w 5 d here for routine prenatal care.     Jaki presents today stating that she was seen on L&D this past weekend with nausea and vomiting.  This has been ongoing and she has Zofran at home.  Mother is also sick at home with URI symptoms.  States that she was hypokalemic and given IV fluids.  Cervix was closed and thick.  She is having normal daily fetal movements. She denies headache, vision changes, RUQ pain, regular contractions, vaginal bleeding and leakage of fluid.    GBS negative    Pregnancy has been complicated by:  Patient Active Problem List   Diagnosis    Scoliosis of thoracolumbar spine    Anorexia    BREECH (RSLVD)    Anxiety    Diet controlled gestational diabetes mellitus (GDM) in third trimester    38 weeks gestation of pregnancy       Physical exam:  Vitals:    General appearance: Patient is in NAD.  Abdomen: Fundal height = 35 cm. + FM on exam.   bpm.  Cephalic presentation.  Extremities nontender without edema bilaterally.    Assessment/plan:  Vitals:    24 1042   BP: 120/72   Site: Right Upper Arm   Position: Sitting   Cuff Size: Medium Adult   Weight: 58.1 kg (128 lb)         labor and kick count precautions given.  The patient was instructed on fetal kick counts and was given a kick sheet to complete every 8 hours. This is to begin at 28 weeks gestation. She was instructed that the baby should move at a minimum of ten times within one hour after a meal. The patient was instructed to lay down on her left side twenty minutes after eating and count movements for up to one hour with a target value of ten movements.     She was instructed to notify the office if

## 2024-12-23 ENCOUNTER — ROUTINE PRENATAL (OUTPATIENT)
Dept: OBGYN CLINIC | Age: 19
End: 2024-12-23
Payer: COMMERCIAL

## 2024-12-23 VITALS — WEIGHT: 128 LBS | SYSTOLIC BLOOD PRESSURE: 120 MMHG | DIASTOLIC BLOOD PRESSURE: 72 MMHG | BODY MASS INDEX: 23.41 KG/M2

## 2024-12-23 DIAGNOSIS — O24.410 DIET CONTROLLED GESTATIONAL DIABETES MELLITUS (GDM) IN THIRD TRIMESTER: ICD-10-CM

## 2024-12-23 DIAGNOSIS — Z3A.38 38 WEEKS GESTATION OF PREGNANCY: Primary | ICD-10-CM

## 2024-12-23 DIAGNOSIS — O09.93 SUPERVISION OF HIGH RISK PREGNANCY IN THIRD TRIMESTER: ICD-10-CM

## 2024-12-23 PROCEDURE — 99213 OFFICE O/P EST LOW 20 MIN: CPT | Performed by: OBSTETRICS & GYNECOLOGY

## 2024-12-24 ENCOUNTER — ROUTINE PRENATAL (OUTPATIENT)
Dept: PERINATAL CARE | Age: 19
End: 2024-12-24
Payer: COMMERCIAL

## 2024-12-24 VITALS
BODY MASS INDEX: 23.57 KG/M2 | DIASTOLIC BLOOD PRESSURE: 81 MMHG | HEIGHT: 62 IN | TEMPERATURE: 97.9 F | WEIGHT: 128.09 LBS | HEART RATE: 77 BPM | RESPIRATION RATE: 16 BRPM | SYSTOLIC BLOOD PRESSURE: 128 MMHG | OXYGEN SATURATION: 98 %

## 2024-12-24 DIAGNOSIS — O24.419 GESTATIONAL DIABETES MELLITUS (GDM), ANTEPARTUM, GESTATIONAL DIABETES METHOD OF CONTROL UNSPECIFIED: Primary | ICD-10-CM

## 2024-12-24 DIAGNOSIS — Z36.4 ULTRASOUND FOR ANTENATAL SCREENING FOR FETAL GROWTH RESTRICTION: ICD-10-CM

## 2024-12-24 DIAGNOSIS — Z3A.38 38 WEEKS GESTATION OF PREGNANCY: ICD-10-CM

## 2024-12-24 DIAGNOSIS — Z36.3 ENCOUNTER FOR ROUTINE SCREENING FOR MALFORMATION USING ULTRASONICS: ICD-10-CM

## 2024-12-24 DIAGNOSIS — O26.13 LOW WEIGHT GAIN DURING PREGNANCY IN THIRD TRIMESTER: ICD-10-CM

## 2024-12-24 DIAGNOSIS — O99.891 CURRENT MATERNAL CONDITION AFFECTING PREGNANCY: ICD-10-CM

## 2024-12-24 PROCEDURE — 76819 FETAL BIOPHYS PROFIL W/O NST: CPT | Performed by: OBSTETRICS & GYNECOLOGY

## 2024-12-24 PROCEDURE — 99999 PR OFFICE/OUTPT VISIT,PROCEDURE ONLY: CPT | Performed by: OBSTETRICS & GYNECOLOGY

## 2024-12-24 PROCEDURE — 76805 OB US >/= 14 WKS SNGL FETUS: CPT | Performed by: OBSTETRICS & GYNECOLOGY

## 2024-12-24 ASSESSMENT — PAIN SCALES - GENERAL: PAINLEVEL_OUTOF10: 6

## 2024-12-24 ASSESSMENT — PAIN DESCRIPTION - LOCATION: LOCATION: BACK;ABDOMEN

## 2024-12-24 NOTE — PROGRESS NOTES
Glycemic control not assessed because patient failed to bring documentation of blood glucose monitoring.     Strongly advised patient to be compliant with blood sugar monitoring as previously advised to minimize the potential of adverse  outcomes (e.g. fetal demise/stillbirth, polyhydramnios/oligohydramnios, fetal growth abnormalities, pregnancy loss, hypertensive disorders of pregnancy, indicated  delivery, etc.), potential maternal morbidities, etc.

## 2024-12-30 ENCOUNTER — HOSPITAL ENCOUNTER (OUTPATIENT)
Age: 19
Setting detail: OBSERVATION
Discharge: HOME | End: 2024-12-30
Attending: OBSTETRICS & GYNECOLOGY | Admitting: OBSTETRICS & GYNECOLOGY
Payer: COMMERCIAL

## 2024-12-30 VITALS
RESPIRATION RATE: 18 BRPM | DIASTOLIC BLOOD PRESSURE: 81 MMHG | OXYGEN SATURATION: 98 % | TEMPERATURE: 97.7 F | HEART RATE: 85 BPM | SYSTOLIC BLOOD PRESSURE: 117 MMHG

## 2024-12-30 PROBLEM — Z3A.39 39 WEEKS GESTATION OF PREGNANCY: Status: ACTIVE | Noted: 2024-12-30

## 2024-12-30 LAB
BACTERIA URNS QL MICRO: ABNORMAL
BILIRUB UR QL STRIP: NEGATIVE
CASTS #/AREA URNS LPF: ABNORMAL /LPF (ref 0–2)
CLARITY UR: CLEAR
COLOR UR: YELLOW
EPI CELLS #/AREA URNS HPF: ABNORMAL /HPF (ref 0–5)
GLUCOSE UR STRIP-MCNC: NEGATIVE MG/DL
HGB UR QL STRIP.AUTO: NEGATIVE
KETONES UR STRIP-MCNC: NEGATIVE MG/DL
LEUKOCYTE ESTERASE UR QL STRIP: ABNORMAL
NITRITE UR QL STRIP: NEGATIVE
PH UR STRIP: 7.5 [PH] (ref 5–8)
PROT UR STRIP-MCNC: NEGATIVE MG/DL
RBC #/AREA URNS HPF: ABNORMAL /HPF (ref 0–2)
SP GR UR STRIP: 1 (ref 1–1.03)
UROBILINOGEN UR STRIP-ACNC: NORMAL EU/DL (ref 0–1)
WBC #/AREA URNS HPF: ABNORMAL /HPF (ref 0–5)

## 2024-12-30 PROCEDURE — 6370000000 HC RX 637 (ALT 250 FOR IP)

## 2024-12-30 PROCEDURE — 87086 URINE CULTURE/COLONY COUNT: CPT

## 2024-12-30 PROCEDURE — G0378 HOSPITAL OBSERVATION PER HR: HCPCS

## 2024-12-30 PROCEDURE — 99213 OFFICE O/P EST LOW 20 MIN: CPT

## 2024-12-30 PROCEDURE — 81001 URINALYSIS AUTO W/SCOPE: CPT

## 2024-12-30 RX ORDER — ACETAMINOPHEN 500 MG
1000 TABLET ORAL EVERY 8 HOURS SCHEDULED
Status: DISCONTINUED | OUTPATIENT
Start: 2024-12-30 | End: 2024-12-30 | Stop reason: HOSPADM

## 2024-12-30 RX ORDER — ONDANSETRON 2 MG/ML
4 INJECTION INTRAMUSCULAR; INTRAVENOUS EVERY 6 HOURS PRN
Status: DISCONTINUED | OUTPATIENT
Start: 2024-12-30 | End: 2024-12-30 | Stop reason: HOSPADM

## 2024-12-30 RX ORDER — CYCLOBENZAPRINE HCL 10 MG
10 TABLET ORAL ONCE
Status: COMPLETED | OUTPATIENT
Start: 2024-12-30 | End: 2024-12-30

## 2024-12-30 RX ORDER — ONDANSETRON 4 MG/1
4 TABLET, ORALLY DISINTEGRATING ORAL EVERY 8 HOURS PRN
Status: DISCONTINUED | OUTPATIENT
Start: 2024-12-30 | End: 2024-12-30 | Stop reason: HOSPADM

## 2024-12-30 RX ORDER — LIDOCAINE 4 G/G
1 PATCH TOPICAL DAILY
Status: DISCONTINUED | OUTPATIENT
Start: 2024-12-30 | End: 2024-12-30 | Stop reason: HOSPADM

## 2024-12-30 RX ADMIN — ACETAMINOPHEN 1000 MG: 500 TABLET ORAL at 13:54

## 2024-12-30 RX ADMIN — CYCLOBENZAPRINE 10 MG: 10 TABLET, FILM COATED ORAL at 14:17

## 2024-12-30 ASSESSMENT — PAIN DESCRIPTION - LOCATION
LOCATION: ABDOMEN;BACK
LOCATION: ABDOMEN;BACK

## 2024-12-30 ASSESSMENT — PAIN SCALES - GENERAL
PAINLEVEL_OUTOF10: 6
PAINLEVEL_OUTOF10: 6

## 2024-12-30 ASSESSMENT — PAIN DESCRIPTION - DESCRIPTORS: DESCRIPTORS: CRAMPING

## 2024-12-30 NOTE — DISCHARGE INSTRUCTIONS
Notify Physician for:       *  Regular contractions that are  5 minutes apart or less lasting longer than 1 hr for 1st baby, 10 mins apart or less if 2nd baby or greater.     *  Leaking or gush of fluid from vagina.     *  Any vaginal bleeding that is heavier than a menstrual period.     *  Decrease or absence of baby movement.     *  Vaginal pressure, low backache.     *  Vomiting or diarrhea for several hours, and unable to take in/ keep fluids or food down.     *  Increase or change in vaginal discharge.     *  Abdominal or menstrual- like cramping that is constant or intermittent.     *  Fever and/ or chills.                    *  Headache              *  Blurred and or visual changes-  (spots before eyes, \"floaters\")              *  Upper abdominal/ epigastric pain  Activities:       As tolerated                      Diet:          As tolerated    Drink 10- 12 glasses of water daily.    Be sure to keep next scheduled appt, and call your Dr. With any questions

## 2024-12-30 NOTE — PROGRESS NOTES
Obstetric/Gynecology Resident Interval Note    SVE repeated >2 hours after initial. Exam remains the same at closed/thick/high. Patient states pain has improved. Low clinical suspicion for spontaneous labor at this time. Additionally, low clinical suspicion for pyelo as CVA tenderness neg, VSS and no complaints of urinary complaints.     Patient stable for discharge home. Patient counseled on labor precautions, PO hydration, and use of tylenol PRN. Counseled on signs and symptoms of PreE. All questions and concerns were addressed and patient expressed understanding. Patient advised to follow up in the office for next appointment on 1/2/25.    Vitals:    12/30/24 1309 12/30/24 1556   BP: 117/81    Pulse: (!) 107 85   Resp: 18    Temp: 97.7 °F (36.5 °C)    TempSrc: Oral    SpO2: 97% 98%         Lizzeth Morocho MD  OB/GYN Resident, PGY3  [place], [place]  12/30/2024, 3:55 PM

## 2024-12-30 NOTE — FLOWSHEET NOTE
Patient to labor and delivery ambulatory from home patient c/o back pain and abdominal pain for last 2 days, patient states good fetal movement and denies LOF or vag bleeding.

## 2024-12-30 NOTE — H&P
OBSTETRICAL HISTORY AND PHYSICAL  [facility]    Date: 2024       Time: 12:55 PM   Patient Name: Jaki Waters     Patient : 2005  [facility]    Admission Date/Time: 2024 12:51 PM      CC: R/o contractions     HPI: Jaki Waters is a 19 y.o.  at 39w5d who presents with contractions and to rule out labor. Patient denies any fever, chills, N/V, headaches, vision changes, chest pain, shortness of breath, RUQ pain, abdominal pain, and increased swelling/tenderness in bilateral lower extremities. Patient denies any vaginal discharge and any urinary complaints. The patient reports fetal movement is present, complains of contractions, denies loss of fluid, denies vaginal bleeding.    DATING:  LMP: Patient's last menstrual period was 2024 (exact date).  Estimated Date of Delivery: 25   Based on: US, at 10 5/7 weeks GA    PREGNANCY RISK FACTORS:  Patient Active Problem List   Diagnosis    Scoliosis of thoracolumbar spine    Anorexia    BREECH (RSLVD)    Anxiety    Diet controlled gestational diabetes mellitus (GDM) in third trimester    38 weeks gestation of pregnancy        Steroids Given In This Pregnancy:  no     REVIEW OF SYSTEMS:   Constitutional: negative fever, negative chills, negative weight changes   HEENT: negative visual disturbances, negative headaches, negative dizziness, negative hearing loss  Breast: Negative breast abnormalities, negative breast lumps, negative nipple discharge  Respiratory: negative dyspnea, negative cough, negative SOB  Cardiovascular: negative chest pain,  negative palpitations, negative arrhythmia, negative syncope   Gastrointestinal: positive abdominal pain, negative RUQ pain, negative N/V, negative diarrhea, negative constipation, negative bowel changes, negative heartburn   Genitourinary: negative dysuria, negative hematuria, negative urinary incontinence, negative vaginal discharge, negative

## 2024-12-31 LAB
MICROORGANISM SPEC CULT: NO GROWTH
SERVICE CMNT-IMP: NORMAL
SPECIMEN DESCRIPTION: NORMAL

## 2025-01-02 ENCOUNTER — ROUTINE PRENATAL (OUTPATIENT)
Dept: OBGYN CLINIC | Age: 20
End: 2025-01-02
Payer: COMMERCIAL

## 2025-01-02 ENCOUNTER — HOSPITAL ENCOUNTER (INPATIENT)
Age: 20
LOS: 2 days | Discharge: HOME | DRG: 560 | End: 2025-01-04
Attending: OBSTETRICS & GYNECOLOGY | Admitting: STUDENT IN AN ORGANIZED HEALTH CARE EDUCATION/TRAINING PROGRAM
Payer: COMMERCIAL

## 2025-01-02 VITALS
HEART RATE: 86 BPM | WEIGHT: 129 LBS | DIASTOLIC BLOOD PRESSURE: 91 MMHG | BODY MASS INDEX: 23.59 KG/M2 | SYSTOLIC BLOOD PRESSURE: 139 MMHG

## 2025-01-02 DIAGNOSIS — Z3A.40 40 WEEKS GESTATION OF PREGNANCY: ICD-10-CM

## 2025-01-02 DIAGNOSIS — O16.3 ELEVATED BLOOD PRESSURE AFFECTING PREGNANCY IN THIRD TRIMESTER, ANTEPARTUM: ICD-10-CM

## 2025-01-02 DIAGNOSIS — O09.93 HIGH-RISK PREGNANCY IN THIRD TRIMESTER: Primary | ICD-10-CM

## 2025-01-02 PROBLEM — Z3A.39 39 WEEKS GESTATION OF PREGNANCY: Status: RESOLVED | Noted: 2024-12-30 | Resolved: 2025-01-02

## 2025-01-02 PROBLEM — Z3A.38 38 WEEKS GESTATION OF PREGNANCY: Status: RESOLVED | Noted: 2024-12-19 | Resolved: 2025-01-02

## 2025-01-02 LAB
ABO + RH BLD: NORMAL
ALBUMIN SERPL-MCNC: 3.7 G/DL (ref 3.5–5.2)
ALBUMIN/GLOB SERPL: 1.5 {RATIO} (ref 1–2.5)
ALP SERPL-CCNC: 178 U/L (ref 35–104)
ALT SERPL-CCNC: 16 U/L (ref 10–35)
AMPHET UR QL SCN: NEGATIVE
ANION GAP SERPL CALCULATED.3IONS-SCNC: 11 MMOL/L (ref 9–16)
ARM BAND NUMBER: NORMAL
AST SERPL-CCNC: 26 U/L (ref 10–35)
BARBITURATES UR QL SCN: NEGATIVE
BASOPHILS # BLD: <0.03 K/UL (ref 0–0.2)
BASOPHILS NFR BLD: 0 % (ref 0–2)
BENZODIAZ UR QL: NEGATIVE
BILIRUB SERPL-MCNC: 0.4 MG/DL (ref 0–1.2)
BLOOD BANK SAMPLE EXPIRATION: NORMAL
BLOOD GROUP ANTIBODIES SERPL: NEGATIVE
BUN SERPL-MCNC: 7 MG/DL (ref 6–20)
CALCIUM SERPL-MCNC: 9 MG/DL (ref 8.6–10.4)
CANNABINOIDS UR QL SCN: NEGATIVE
CHLORIDE SERPL-SCNC: 107 MMOL/L (ref 98–107)
CO2 SERPL-SCNC: 18 MMOL/L (ref 20–31)
COCAINE UR QL SCN: NEGATIVE
CREAT SERPL-MCNC: 0.5 MG/DL (ref 0.6–0.9)
CREAT UR-MCNC: 21.5 MG/DL (ref 28–217)
EOSINOPHIL # BLD: 0.05 K/UL (ref 0–0.44)
EOSINOPHILS RELATIVE PERCENT: 1 % (ref 1–4)
ERYTHROCYTE [DISTWIDTH] IN BLOOD BY AUTOMATED COUNT: 12.9 % (ref 11.8–14.4)
FENTANYL UR QL: NEGATIVE
GFR, ESTIMATED: >90 ML/MIN/1.73M2
GLUCOSE BLD-MCNC: 103 MG/DL (ref 65–105)
GLUCOSE SERPL-MCNC: 94 MG/DL (ref 74–99)
HCT VFR BLD AUTO: 36.8 % (ref 36.3–47.1)
HGB BLD-MCNC: 12.1 G/DL (ref 11.9–15.1)
IMM GRANULOCYTES # BLD AUTO: 0.1 K/UL (ref 0–0.3)
IMM GRANULOCYTES NFR BLD: 1 %
LYMPHOCYTES NFR BLD: 1.34 K/UL (ref 1.2–5.2)
LYMPHOCYTES RELATIVE PERCENT: 17 % (ref 25–45)
MCH RBC QN AUTO: 30.3 PG (ref 25.2–33.5)
MCHC RBC AUTO-ENTMCNC: 32.9 G/DL (ref 28.4–34.8)
MCV RBC AUTO: 92.2 FL (ref 82.6–102.9)
METHADONE UR QL: NEGATIVE
MONOCYTES NFR BLD: 0.49 K/UL (ref 0.1–1.4)
MONOCYTES NFR BLD: 6 % (ref 2–8)
NEUTROPHILS NFR BLD: 75 % (ref 34–64)
NEUTS SEG NFR BLD: 6.09 K/UL (ref 1.8–8)
NRBC BLD-RTO: 0 PER 100 WBC
OPIATES UR QL SCN: NEGATIVE
OXYCODONE UR QL SCN: NEGATIVE
PCP UR QL SCN: NEGATIVE
PLATELET # BLD AUTO: 172 K/UL (ref 138–453)
PMV BLD AUTO: 10.5 FL (ref 8.1–13.5)
POTASSIUM SERPL-SCNC: 3.6 MMOL/L (ref 3.7–5.3)
PROT SERPL-MCNC: 6.2 G/DL (ref 6.6–8.7)
RBC # BLD AUTO: 3.99 M/UL (ref 3.95–5.11)
SODIUM SERPL-SCNC: 136 MMOL/L (ref 136–145)
T PALLIDUM AB SER QL IA: NONREACTIVE
TEST INFORMATION: NORMAL
TOTAL PROTEIN, URINE: <4 MG/DL
URINE TOTAL PROTEIN CREATININE RATIO: ABNORMAL (ref 0–0.2)
WBC OTHER # BLD: 8.1 K/UL (ref 4.5–13.5)

## 2025-01-02 PROCEDURE — 82947 ASSAY GLUCOSE BLOOD QUANT: CPT

## 2025-01-02 PROCEDURE — 6370000000 HC RX 637 (ALT 250 FOR IP): Performed by: STUDENT IN AN ORGANIZED HEALTH CARE EDUCATION/TRAINING PROGRAM

## 2025-01-02 PROCEDURE — 3E0P7VZ INTRODUCTION OF HORMONE INTO FEMALE REPRODUCTIVE, VIA NATURAL OR ARTIFICIAL OPENING: ICD-10-PCS | Performed by: STUDENT IN AN ORGANIZED HEALTH CARE EDUCATION/TRAINING PROGRAM

## 2025-01-02 PROCEDURE — 86780 TREPONEMA PALLIDUM: CPT

## 2025-01-02 PROCEDURE — 99213 OFFICE O/P EST LOW 20 MIN: CPT | Performed by: STUDENT IN AN ORGANIZED HEALTH CARE EDUCATION/TRAINING PROGRAM

## 2025-01-02 PROCEDURE — 1220000000 HC SEMI PRIVATE OB R&B

## 2025-01-02 PROCEDURE — 6370000000 HC RX 637 (ALT 250 FOR IP)

## 2025-01-02 PROCEDURE — 82570 ASSAY OF URINE CREATININE: CPT

## 2025-01-02 PROCEDURE — 86901 BLOOD TYPING SEROLOGIC RH(D): CPT

## 2025-01-02 PROCEDURE — 80307 DRUG TEST PRSMV CHEM ANLYZR: CPT

## 2025-01-02 PROCEDURE — 84156 ASSAY OF PROTEIN URINE: CPT

## 2025-01-02 PROCEDURE — 80053 COMPREHEN METABOLIC PANEL: CPT

## 2025-01-02 PROCEDURE — 85025 COMPLETE CBC W/AUTO DIFF WBC: CPT

## 2025-01-02 PROCEDURE — 6360000002 HC RX W HCPCS

## 2025-01-02 PROCEDURE — 86900 BLOOD TYPING SEROLOGIC ABO: CPT

## 2025-01-02 PROCEDURE — 86850 RBC ANTIBODY SCREEN: CPT

## 2025-01-02 RX ORDER — MORPHINE SULFATE 10 MG/ML
10 INJECTION INTRAVENOUS ONCE
Status: COMPLETED | OUTPATIENT
Start: 2025-01-02 | End: 2025-01-02

## 2025-01-02 RX ORDER — SODIUM CHLORIDE 9 MG/ML
INJECTION, SOLUTION INTRAVENOUS CONTINUOUS
Status: DISCONTINUED | OUTPATIENT
Start: 2025-01-02 | End: 2025-01-03 | Stop reason: HOSPADM

## 2025-01-02 RX ORDER — PROCHLORPERAZINE EDISYLATE 5 MG/ML
10 INJECTION INTRAMUSCULAR; INTRAVENOUS ONCE
Status: COMPLETED | OUTPATIENT
Start: 2025-01-02 | End: 2025-01-02

## 2025-01-02 RX ORDER — SODIUM CHLORIDE 0.9 % (FLUSH) 0.9 %
5-40 SYRINGE (ML) INJECTION PRN
Status: DISCONTINUED | OUTPATIENT
Start: 2025-01-02 | End: 2025-01-03 | Stop reason: HOSPADM

## 2025-01-02 RX ORDER — FAMOTIDINE 20 MG/1
20 TABLET, FILM COATED ORAL 2 TIMES DAILY PRN
Status: DISCONTINUED | OUTPATIENT
Start: 2025-01-02 | End: 2025-01-03

## 2025-01-02 RX ORDER — GLUCAGON 1 MG/ML
1 KIT INJECTION PRN
Status: DISCONTINUED | OUTPATIENT
Start: 2025-01-02 | End: 2025-01-03

## 2025-01-02 RX ORDER — SODIUM CHLORIDE, SODIUM LACTATE, POTASSIUM CHLORIDE, AND CALCIUM CHLORIDE .6; .31; .03; .02 G/100ML; G/100ML; G/100ML; G/100ML
1000 INJECTION, SOLUTION INTRAVENOUS PRN
Status: DISCONTINUED | OUTPATIENT
Start: 2025-01-02 | End: 2025-01-03

## 2025-01-02 RX ORDER — ACETAMINOPHEN 500 MG
1000 TABLET ORAL EVERY 6 HOURS PRN
Status: DISCONTINUED | OUTPATIENT
Start: 2025-01-02 | End: 2025-01-03 | Stop reason: HOSPADM

## 2025-01-02 RX ORDER — SODIUM CHLORIDE 0.9 % (FLUSH) 0.9 %
5-40 SYRINGE (ML) INJECTION EVERY 12 HOURS SCHEDULED
Status: DISCONTINUED | OUTPATIENT
Start: 2025-01-02 | End: 2025-01-03 | Stop reason: HOSPADM

## 2025-01-02 RX ORDER — DEXTROSE MONOHYDRATE 100 MG/ML
INJECTION, SOLUTION INTRAVENOUS CONTINUOUS PRN
Status: DISCONTINUED | OUTPATIENT
Start: 2025-01-02 | End: 2025-01-03

## 2025-01-02 RX ORDER — SODIUM CHLORIDE, SODIUM LACTATE, POTASSIUM CHLORIDE, AND CALCIUM CHLORIDE .6; .31; .03; .02 G/100ML; G/100ML; G/100ML; G/100ML
500 INJECTION, SOLUTION INTRAVENOUS PRN
Status: DISCONTINUED | OUTPATIENT
Start: 2025-01-02 | End: 2025-01-03

## 2025-01-02 RX ORDER — INSULIN LISPRO 100 [IU]/ML
0-12 INJECTION, SOLUTION INTRAVENOUS; SUBCUTANEOUS 4 TIMES DAILY PRN
Status: DISCONTINUED | OUTPATIENT
Start: 2025-01-02 | End: 2025-01-03

## 2025-01-02 RX ORDER — SODIUM CHLORIDE 9 MG/ML
INJECTION, SOLUTION INTRAVENOUS PRN
Status: DISCONTINUED | OUTPATIENT
Start: 2025-01-02 | End: 2025-01-03 | Stop reason: HOSPADM

## 2025-01-02 RX ADMIN — MORPHINE SULFATE 10 MG: 10 INJECTION INTRAVENOUS at 22:45

## 2025-01-02 RX ADMIN — Medication 25 MCG: at 18:02

## 2025-01-02 RX ADMIN — ACETAMINOPHEN 1000 MG: 500 TABLET ORAL at 17:10

## 2025-01-02 RX ADMIN — PROCHLORPERAZINE EDISYLATE 10 MG: 5 INJECTION INTRAMUSCULAR; INTRAVENOUS at 22:45

## 2025-01-02 RX ADMIN — SODIUM CHLORIDE: 9 INJECTION, SOLUTION INTRAVENOUS at 20:10

## 2025-01-02 RX ADMIN — FAMOTIDINE 20 MG: 20 TABLET, FILM COATED ORAL at 14:26

## 2025-01-02 RX ADMIN — Medication 25 MCG: at 14:04

## 2025-01-02 ASSESSMENT — PAIN DESCRIPTION - DESCRIPTORS: DESCRIPTORS: CRAMPING

## 2025-01-02 ASSESSMENT — PAIN DESCRIPTION - LOCATION
LOCATION: BACK
LOCATION: BACK;ABDOMEN

## 2025-01-02 ASSESSMENT — PAIN SCALES - GENERAL
PAINLEVEL_OUTOF10: 8
PAINLEVEL_OUTOF10: 6

## 2025-01-02 NOTE — CARE COORDINATION
01/02/25 1438   Readmission Assessment   Number of Days since last admission? 1-7 days  (Not True ReAd- Previous times were OBS or OP in a BED)   Previous Disposition Other (comment)   Who is being Interviewed Unable to Complete  (Not True ReAd- Previous times were OBS or OP in a BED)   What was the patient's/caregiver's perception as to why they think they needed to return back to the hospital? Other (Comment)  (Admitted from OB office for IOL due to elevated BP at term)   Did you visit your Primary Care Physician after you left the hospital, before you returned this time? No  (Not True ReAd- Previous times were OBS or OP in a BED)   Why weren't you able to visit your PCP? Other (Comment)  (Not True ReAd- Previous times were OBS or OP in a BED)   Did you see a specialist, such as Cardiac, Pulmonary, Orthopedic Physician, etc. after you left the hospital? Yes   Who advised the patient to return to the hospital? Physician   Does the patient report anything that got in the way of taking their medications? No  (Not True ReAd- Previous times were OBS or OP in a BED)   In our efforts to provide the best possible care to you and others like you, can you think of anything that we could have done to help you after you left the hospital the first time, so that you might not have needed to return so soon? Other (Comment)  (Not True ReAd- Previous times were OBS or OP in a BED)

## 2025-01-02 NOTE — PROGRESS NOTES
Prenatal Visit    Jaki Waters is a 19 y.o. female  at 40w1d IUP    The patient was seen and evaluated. She has no complaints today besides feeling very uncomfortable. She is requesting to be delivered. There was positive fetal movements. She denies contractions, vaginal bleeding and leakage of fluid. She currently denies any signs or symptoms of pre-eclampsia which include headache, vision changes, RUQ pain. Signs and symptoms of labor were reviewed. She was checked on 24 and noted to be closed.     Allergies:  Patient has no known allergies.    Vitals:     BP: (!) 139/91  Weight - Scale: 58.5 kg (129 lb)  Pulse: 86  Patient Position: Sitting  Fundal Height (cm): 29 cm  Fetal HR: 138  Movement: Present     Physical:   General appearance: no apparent distress, alert and cooperative  HEENT: head atraumatic, normocephalic, trachea midline, moist mucous membranes   Neurologic: alert, oriented, normal speech   Lungs: no increased work of breathing,   Abdomen: soft, gravid, non-tender on palpation    Musculoskeletal: no gross abnormalities, range of motion appropriate for age   Psychiatric: mood appropriate, normal affect      Assessment & Plan:  Jaki Waters is a 19 y.o. female  at 40w1d IUP   - VSS    - Elevated BP x2 noted - denies any s/s of preE   - GBS negative culture collected on 24               - Continue taking prenatal vitamins QD               - Tdap vaccination: already received                - Influenza vaccination: declined               - Rhogam: is not indicated in this pregnancy                 - COVID-19 vaccination:advise booster during pregnancy                  -  testing: no indications at this time               - Follow up as clinically indicated with MFM    - Due to elevated blood pressures, patient sent to L&D for rule out preeclampsia. She is requesting to stay for induction regardless if she meets criteria  Return in about 2 weeks (around

## 2025-01-02 NOTE — FLOWSHEET NOTE
Pt received to L&D per w/c from ER; sent from office with elevated BP et induction of labor.  Placed in room 706.  Up to BR et gowned.

## 2025-01-02 NOTE — H&P
OBSTETRICAL HISTORY AND PHYSICAL  [facility]    Date: 2025       Time: 3:10 PM   Patient Name: Jaki Waters     Patient : 2005  Room/Bed: 0706/0706-01    Admission Date/Time: 2025 12:29 PM      CC: IOL @ term     HPI: Jaki Waters is a 19 y.o.  at 40w1d who presents for IOL at term. Patient denies any fever, chills, N/V, headaches, vision changes, chest pain, shortness of breath, RUQ pain, abdominal pain, and increased swelling/tenderness in bilateral lower extremities. Patient denies any vaginal discharge and any urinary complaints. The patient reports fetal movement is present, denies contractions, denies loss of fluid, denies vaginal bleeding.    DATING:  LMP: Patient's last menstrual period was 2024 (exact date).  Estimated Date of Delivery: 25   Based on: early ultrasound, at 10 5/7 weeks GA    PREGNANCY RISK FACTORS:  Patient Active Problem List   Diagnosis    Scoliosis of thoracolumbar spine    Anorexia    BREECH (RSLVD)    Anxiety    Diet controlled gestational diabetes mellitus (GDM) in third trimester    Elevated blood pressure affecting pregnancy in third trimester, antepartum    40 weeks gestation of pregnancy        Steroids Given In This Pregnancy:  no     REVIEW OF SYSTEMS:   Constitutional: negative fever, negative chills, negative weight changes   HEENT: negative visual disturbances, negative headaches, negative dizziness, negative hearing loss  Breast: Negative breast abnormalities, negative breast lumps, negative nipple discharge  Respiratory: negative dyspnea, negative cough, negative SOB  Cardiovascular: negative chest pain,  negative palpitations, negative arrhythmia, negative syncope   Gastrointestinal: negative abdominal pain, negative RUQ pain, negative N/V, negative diarrhea, negative constipation, negative bowel changes, negative heartburn   Genitourinary: negative dysuria, negative hematuria, negative urinary

## 2025-01-02 NOTE — DISCHARGE SUMMARY
Obstetric Discharge Summary  [facility]    Patient Name: Jaki Waters  Patient : 2005  Primary Care Physician: No primary care provider on file.  Admit Date: 2025    Principal Diagnosis: IUP at 40w1d, admitted for IOL at term    Her pregnancy has been complicated by:   Patient Active Problem List   Diagnosis    Scoliosis of thoracolumbar spine    Anorexia    Anxiety    Diet controlled gestational diabetes mellitus (GDM) in third trimester    PreE without SF (G2)     1/3/25 M Apg 8/9 Wt 7#11       Infection Present?: No  Hospital Acquired: No    Surgical Operations & Procedures:  Analgesia: epidural  Delivery Type: Spontaneous Vaginal Delivery: See Labor and Delivery Summary   Laceration(s): bilateral deena-urethral not requiring repair, left sulcal repaired with 3-0 Vicryl; small non-expanding hematoma on the perineum    Consultations: Anesthesia    Pertinent Findings & Procedures:   Jaki Waters is a 19 y.o. female  at 40w1d admitted for IOL at term; received Cytotec 25 mcg PO x3, Morphine/Compazine x2, Srivastava balloon, epidural, Amniotomy.     She delivered by induced vaginal a Live Born infant on 1/3/25.       Information for the patient's :  Kyle Waters [[id number]]   male   Birth Weight: 3.5 kg (7 lb 11.5 oz)    Apgars: 8 at 1 minute and 9 at 5 minutes.     Postpartum course:   POD#0: Patient met criteria for gestational hypertension.   POD#1: PreE labs wnl, P/C 1.22. Patient met criteria for PreE w/o SF. Patient denies s/sx of PreE and desires discharge home at this time. BP normotensive.     Course of patient: complicated by gestational hypertension > Preeclampsia without severe features    Discharge to: Home    Readmission planned: no     Indication for 6 week PP 2 hour GTT?: yes    Eligible for 2 week PP virtual visit? no - private patient    Recommendations on Discharge:     Medications:      Medication List        START taking these

## 2025-01-02 NOTE — CARE COORDINATION
ANTEPARTUM NOTE    40 weeks gestation of pregnancy [Z3A.40]    Jaki was admitted to L&D on 1/2/25 for IOL due to elevated BP while in OB office @ 40w1d    OB GYN Provider: Dr. Gaston Wells    Will meet with patient after delivery to verify name/address/phone/insurance and discuss discharge planning.     Anticipate DC home 2 nights after vaginal delivery or 4 nights after C/S delivery as long as hemodynamically stable.

## 2025-01-03 ENCOUNTER — ANESTHESIA EVENT (OUTPATIENT)
Dept: LABOR AND DELIVERY | Age: 20
DRG: 560 | End: 2025-01-03
Payer: COMMERCIAL

## 2025-01-03 ENCOUNTER — ANESTHESIA (OUTPATIENT)
Dept: LABOR AND DELIVERY | Age: 20
DRG: 560 | End: 2025-01-03
Payer: COMMERCIAL

## 2025-01-03 LAB — GLUCOSE BLD-MCNC: 103 MG/DL (ref 65–105)

## 2025-01-03 PROCEDURE — 3700000025 EPIDURAL BLOCK: Performed by: ANESTHESIOLOGY

## 2025-01-03 PROCEDURE — 0KQM0ZZ REPAIR PERINEUM MUSCLE, OPEN APPROACH: ICD-10-PCS | Performed by: STUDENT IN AN ORGANIZED HEALTH CARE EDUCATION/TRAINING PROGRAM

## 2025-01-03 PROCEDURE — 1220000000 HC SEMI PRIVATE OB R&B

## 2025-01-03 PROCEDURE — 7200000001 HC VAGINAL DELIVERY

## 2025-01-03 PROCEDURE — 6360000002 HC RX W HCPCS: Performed by: STUDENT IN AN ORGANIZED HEALTH CARE EDUCATION/TRAINING PROGRAM

## 2025-01-03 PROCEDURE — 10H073Z INSERTION OF MONITORING ELECTRODE INTO PRODUCTS OF CONCEPTION, VIA NATURAL OR ARTIFICIAL OPENING: ICD-10-PCS | Performed by: STUDENT IN AN ORGANIZED HEALTH CARE EDUCATION/TRAINING PROGRAM

## 2025-01-03 PROCEDURE — 6370000000 HC RX 637 (ALT 250 FOR IP)

## 2025-01-03 PROCEDURE — 6360000002 HC RX W HCPCS

## 2025-01-03 PROCEDURE — 6370000000 HC RX 637 (ALT 250 FOR IP): Performed by: STUDENT IN AN ORGANIZED HEALTH CARE EDUCATION/TRAINING PROGRAM

## 2025-01-03 PROCEDURE — 10907ZC DRAINAGE OF AMNIOTIC FLUID, THERAPEUTIC FROM PRODUCTS OF CONCEPTION, VIA NATURAL OR ARTIFICIAL OPENING: ICD-10-PCS | Performed by: STUDENT IN AN ORGANIZED HEALTH CARE EDUCATION/TRAINING PROGRAM

## 2025-01-03 PROCEDURE — 59409 OBSTETRICAL CARE: CPT | Performed by: STUDENT IN AN ORGANIZED HEALTH CARE EDUCATION/TRAINING PROGRAM

## 2025-01-03 PROCEDURE — 82947 ASSAY GLUCOSE BLOOD QUANT: CPT

## 2025-01-03 PROCEDURE — 6360000002 HC RX W HCPCS: Performed by: NURSE ANESTHETIST, CERTIFIED REGISTERED

## 2025-01-03 PROCEDURE — 2500000003 HC RX 250 WO HCPCS: Performed by: STUDENT IN AN ORGANIZED HEALTH CARE EDUCATION/TRAINING PROGRAM

## 2025-01-03 RX ORDER — ONDANSETRON 2 MG/ML
4 INJECTION INTRAMUSCULAR; INTRAVENOUS EVERY 6 HOURS PRN
Status: DISCONTINUED | OUTPATIENT
Start: 2025-01-03 | End: 2025-01-03 | Stop reason: HOSPADM

## 2025-01-03 RX ORDER — ROPIVACAINE HYDROCHLORIDE 2 MG/ML
INJECTION, SOLUTION EPIDURAL; INFILTRATION; PERINEURAL
Status: DISCONTINUED | OUTPATIENT
Start: 2025-01-03 | End: 2025-01-03 | Stop reason: SDUPTHER

## 2025-01-03 RX ORDER — ONDANSETRON 4 MG/1
4 TABLET, ORALLY DISINTEGRATING ORAL EVERY 6 HOURS PRN
Status: DISCONTINUED | OUTPATIENT
Start: 2025-01-03 | End: 2025-01-04 | Stop reason: HOSPADM

## 2025-01-03 RX ORDER — SODIUM CHLORIDE 0.9 % (FLUSH) 0.9 %
5-40 SYRINGE (ML) INJECTION EVERY 12 HOURS SCHEDULED
Status: DISCONTINUED | OUTPATIENT
Start: 2025-01-03 | End: 2025-01-04 | Stop reason: HOSPADM

## 2025-01-03 RX ORDER — BISACODYL 10 MG
10 SUPPOSITORY, RECTAL RECTAL DAILY PRN
Status: DISCONTINUED | OUTPATIENT
Start: 2025-01-03 | End: 2025-01-04 | Stop reason: HOSPADM

## 2025-01-03 RX ORDER — SODIUM CHLORIDE 9 MG/ML
INJECTION, SOLUTION INTRAVENOUS PRN
Status: DISCONTINUED | OUTPATIENT
Start: 2025-01-03 | End: 2025-01-04 | Stop reason: HOSPADM

## 2025-01-03 RX ORDER — CYCLOBENZAPRINE HCL 10 MG
10 TABLET ORAL 3 TIMES DAILY PRN
Status: DISCONTINUED | OUTPATIENT
Start: 2025-01-03 | End: 2025-01-04 | Stop reason: HOSPADM

## 2025-01-03 RX ORDER — ONDANSETRON 2 MG/ML
4 INJECTION INTRAMUSCULAR; INTRAVENOUS EVERY 6 HOURS PRN
Status: DISCONTINUED | OUTPATIENT
Start: 2025-01-03 | End: 2025-01-03

## 2025-01-03 RX ORDER — IBUPROFEN 600 MG/1
600 TABLET, FILM COATED ORAL EVERY 6 HOURS PRN
Qty: 30 TABLET | Refills: 1 | Status: SHIPPED | OUTPATIENT
Start: 2025-01-03

## 2025-01-03 RX ORDER — LANOLIN
CREAM (ML) TOPICAL PRN
Status: DISCONTINUED | OUTPATIENT
Start: 2025-01-03 | End: 2025-01-04 | Stop reason: HOSPADM

## 2025-01-03 RX ORDER — SIMETHICONE 80 MG
80 TABLET,CHEWABLE ORAL EVERY 6 HOURS PRN
Status: DISCONTINUED | OUTPATIENT
Start: 2025-01-03 | End: 2025-01-04 | Stop reason: HOSPADM

## 2025-01-03 RX ORDER — ONDANSETRON 2 MG/ML
4 INJECTION INTRAMUSCULAR; INTRAVENOUS EVERY 6 HOURS PRN
Status: DISCONTINUED | OUTPATIENT
Start: 2025-01-03 | End: 2025-01-04 | Stop reason: HOSPADM

## 2025-01-03 RX ORDER — ONDANSETRON 4 MG/1
4 TABLET, ORALLY DISINTEGRATING ORAL EVERY 6 HOURS PRN
Status: DISCONTINUED | OUTPATIENT
Start: 2025-01-03 | End: 2025-01-03

## 2025-01-03 RX ORDER — NALOXONE HYDROCHLORIDE 0.4 MG/ML
INJECTION, SOLUTION INTRAMUSCULAR; INTRAVENOUS; SUBCUTANEOUS PRN
Status: DISCONTINUED | OUTPATIENT
Start: 2025-01-03 | End: 2025-01-03 | Stop reason: HOSPADM

## 2025-01-03 RX ORDER — SENNA AND DOCUSATE SODIUM 50; 8.6 MG/1; MG/1
2 TABLET, FILM COATED ORAL 2 TIMES DAILY PRN
Qty: 60 TABLET | Refills: 1 | Status: SHIPPED | OUTPATIENT
Start: 2025-01-03

## 2025-01-03 RX ORDER — PROCHLORPERAZINE EDISYLATE 5 MG/ML
10 INJECTION INTRAMUSCULAR; INTRAVENOUS ONCE
Status: COMPLETED | OUTPATIENT
Start: 2025-01-03 | End: 2025-01-03

## 2025-01-03 RX ORDER — ROPIVACAINE HYDROCHLORIDE 2 MG/ML
INJECTION, SOLUTION EPIDURAL; INFILTRATION; PERINEURAL
Status: COMPLETED
Start: 2025-01-03 | End: 2025-01-03

## 2025-01-03 RX ORDER — SENNA AND DOCUSATE SODIUM 50; 8.6 MG/1; MG/1
1 TABLET, FILM COATED ORAL 2 TIMES DAILY
Status: DISCONTINUED | OUTPATIENT
Start: 2025-01-03 | End: 2025-01-04 | Stop reason: HOSPADM

## 2025-01-03 RX ORDER — TRANEXAMIC ACID 10 MG/ML
INJECTION, SOLUTION INTRAVENOUS
Status: DISCONTINUED | OUTPATIENT
Start: 2025-01-03 | End: 2025-01-03

## 2025-01-03 RX ORDER — ACETAMINOPHEN 500 MG
1000 TABLET ORAL EVERY 6 HOURS
Status: DISCONTINUED | OUTPATIENT
Start: 2025-01-03 | End: 2025-01-04 | Stop reason: HOSPADM

## 2025-01-03 RX ORDER — MORPHINE SULFATE 10 MG/ML
10 INJECTION INTRAVENOUS ONCE
Status: COMPLETED | OUTPATIENT
Start: 2025-01-03 | End: 2025-01-03

## 2025-01-03 RX ORDER — IBUPROFEN 600 MG/1
600 TABLET, FILM COATED ORAL EVERY 6 HOURS
Status: DISCONTINUED | OUTPATIENT
Start: 2025-01-03 | End: 2025-01-04 | Stop reason: HOSPADM

## 2025-01-03 RX ORDER — SODIUM CHLORIDE, SODIUM LACTATE, POTASSIUM CHLORIDE, AND CALCIUM CHLORIDE .6; .31; .03; .02 G/100ML; G/100ML; G/100ML; G/100ML
1000 INJECTION, SOLUTION INTRAVENOUS ONCE
Status: DISCONTINUED | OUTPATIENT
Start: 2025-01-03 | End: 2025-01-04 | Stop reason: HOSPADM

## 2025-01-03 RX ORDER — LIDOCAINE HYDROCHLORIDE AND EPINEPHRINE 15; 5 MG/ML; UG/ML
INJECTION, SOLUTION EPIDURAL
Status: DISCONTINUED | OUTPATIENT
Start: 2025-01-03 | End: 2025-01-03 | Stop reason: SDUPTHER

## 2025-01-03 RX ORDER — SODIUM CHLORIDE 0.9 % (FLUSH) 0.9 %
5-40 SYRINGE (ML) INJECTION PRN
Status: DISCONTINUED | OUTPATIENT
Start: 2025-01-03 | End: 2025-01-04 | Stop reason: HOSPADM

## 2025-01-03 RX ADMIN — ACETAMINOPHEN 1000 MG: 500 TABLET ORAL at 16:44

## 2025-01-03 RX ADMIN — Medication 1 MILLI-UNITS/MIN: at 07:43

## 2025-01-03 RX ADMIN — IBUPROFEN 600 MG: 600 TABLET, FILM COATED ORAL at 20:22

## 2025-01-03 RX ADMIN — ONDANSETRON 4 MG: 2 INJECTION INTRAMUSCULAR; INTRAVENOUS at 04:38

## 2025-01-03 RX ADMIN — SODIUM CHLORIDE, PRESERVATIVE FREE 10 ML: 5 INJECTION INTRAVENOUS at 20:22

## 2025-01-03 RX ADMIN — MORPHINE SULFATE 10 MG: 10 INJECTION INTRAVENOUS at 04:40

## 2025-01-03 RX ADMIN — Medication 25 MCG: at 02:20

## 2025-01-03 RX ADMIN — IBUPROFEN 600 MG: 600 TABLET, FILM COATED ORAL at 14:29

## 2025-01-03 RX ADMIN — PROCHLORPERAZINE EDISYLATE 10 MG: 5 INJECTION INTRAMUSCULAR; INTRAVENOUS at 04:40

## 2025-01-03 RX ADMIN — SENNOSIDES AND DOCUSATE SODIUM 1 TABLET: 50; 8.6 TABLET ORAL at 20:22

## 2025-01-03 RX ADMIN — ACETAMINOPHEN 1000 MG: 500 TABLET ORAL at 22:41

## 2025-01-03 RX ADMIN — Medication 166.7 ML: at 08:01

## 2025-01-03 RX ADMIN — LIDOCAINE HYDROCHLORIDE,EPINEPHRINE BITARTRATE 3 ML: 15; .005 INJECTION, SOLUTION EPIDURAL; INFILTRATION; INTRACAUDAL; PERINEURAL at 05:47

## 2025-01-03 RX ADMIN — IBUPROFEN 600 MG: 600 TABLET, FILM COATED ORAL at 08:33

## 2025-01-03 RX ADMIN — ROPIVACAINE HYDROCHLORIDE 8 ML/HR: 2 INJECTION, SOLUTION EPIDURAL; INFILTRATION; PERINEURAL at 05:56

## 2025-01-03 RX ADMIN — ROPIVACAINE HYDROCHLORIDE 8 ML: 2 INJECTION, SOLUTION EPIDURAL; INFILTRATION at 05:56

## 2025-01-03 RX ADMIN — BENZOCAINE AND LEVOMENTHOL: 200; 5 SPRAY TOPICAL at 13:05

## 2025-01-03 ASSESSMENT — PAIN SCALES - GENERAL
PAINLEVEL_OUTOF10: 1
PAINLEVEL_OUTOF10: 7
PAINLEVEL_OUTOF10: 0
PAINLEVEL_OUTOF10: 6
PAINLEVEL_OUTOF10: 3
PAINLEVEL_OUTOF10: 0
PAINLEVEL_OUTOF10: 5

## 2025-01-03 ASSESSMENT — PAIN DESCRIPTION - LOCATION
LOCATION: ABDOMEN
LOCATION: ABDOMEN;PERINEUM
LOCATION: ABDOMEN;PERINEUM
LOCATION: ABDOMEN
LOCATION: PERINEUM

## 2025-01-03 ASSESSMENT — PAIN DESCRIPTION - DESCRIPTORS
DESCRIPTORS: DISCOMFORT
DESCRIPTORS: CRAMPING
DESCRIPTORS: CRAMPING
DESCRIPTORS: CRAMPING;SORE
DESCRIPTORS: CRAMPING;SORE

## 2025-01-03 ASSESSMENT — PAIN - FUNCTIONAL ASSESSMENT
PAIN_FUNCTIONAL_ASSESSMENT: ACTIVITIES ARE NOT PREVENTED
PAIN_FUNCTIONAL_ASSESSMENT: ACTIVITIES ARE NOT PREVENTED

## 2025-01-03 ASSESSMENT — PAIN DESCRIPTION - ORIENTATION
ORIENTATION: MID;LOWER
ORIENTATION: MID;LOWER

## 2025-01-03 NOTE — ANESTHESIA PROCEDURE NOTES
Epidural Block    Patient location during procedure: OB  Start time: 1/3/2025 5:32 AM  End time: 1/3/2025 5:46 AM  Reason for block: labor epidural  Staffing  Performed: resident/CRNA   Anesthesiologist: Felisa Bansal MD  Resident/CRNA: Bryan De La Fuente APRN - CRNA  Performed by: Bryan De La Fuente APRN - CRNA  Authorized by: Felisa Bansal MD    Epidural  Patient position: sitting  Prep: Betadine  Patient monitoring: continuous pulse ox and frequent blood pressure checks  Approach: midline  Location: L4-5  Injection technique: NATALY saline  Guidance: paresthesia technique  Provider prep: mask and sterile gloves  Needle  Needle type: Tuohy   Needle gauge: 17 G  Needle length: 3.5 in  Needle insertion depth: 5 cm  Catheter type: end hole  Catheter at skin depth: 12 cm  Test dose: negativeCatheter Secured: tegaderm and tape  Assessment  Hemodynamics: stable  Attempts: 1  Outcomes: uncomplicated and patient tolerated procedure well  Preanesthetic Checklist  Completed: patient identified, IV checked, site marked, risks and benefits discussed, surgical/procedural consents, equipment checked, pre-op evaluation, timeout performed, anesthesia consent given, oxygen available and monitors applied/VS acknowledged

## 2025-01-03 NOTE — CONSULTS
Breastfeeding packet given and reviewed, pt and fob states baby nursed in recovery and also got 30mls of formula. Reviewed  feeding expectations and encouraged lots of skin to skin and frequent attempts. Pt has a pump at home if needed. Encouraged to reach out as needed.

## 2025-01-03 NOTE — PROGRESS NOTES
Labor Progress Note    Jaki Waters is a 19 y.o. female  at 40w1d  The patient was seen and examined. Her pain is well controlled. She reports fetal movement is present, complains of contractions, denies loss of fluid, denies vaginal bleeding.       Vital Signs:  Vitals:    25 1243 25 1246 25 1802 25 1945   BP: 125/74  126/65 129/74   Pulse: 90  81 86   Resp: 16  16 16   Temp: 98.1 °F (36.7 °C)  98.2 °F (36.8 °C)    TempSrc: Oral  Oral    Weight:  58.5 kg (129 lb)     Height:  1.575 m (5' 2\")         FHT:  135 , moderate variability, accelerations present, decelerations absent  Contractions: regular, every 2 minutes    Chaperone for Intimate Exam: Chaperone was present for entire exam, Chaperone Name: Gris HERNANDEZ  Cervical Exam: 1 cm dilated, 70 effaced, -1 station  Pitocin: @ 0 mu/min    Membranes: Intact  Scalp Electrode in place: absent  Intrauterine Pressure Catheter in Place: absent    Interventions: SVE     Assessment/Plan:  Jaki Waters is a 19 y.o. female  at 40w1d admitted for IOL @ term    - GBS negative, No indication for GBS prophylaxis   - VSS, afebrile    - Cat 1 FHT and TOCO showing regular contractions   - Membranes Intact    - SVE 1/70%/-1   - cEFM and TOCO   - S/p Cytotec 25 mcg PO x2   - Continue to monitor     Attending updated and in agreement with plan    Liza Blevins MD  Ob/Gyn Resident  2025, 10:26 PM

## 2025-01-03 NOTE — FLOWSHEET NOTE
0532,  ALE Adams, at bedside.  Epidural procedure explained, risks discussed.  Pt verbalizes consent for epidural.  0534 patient positioned for epidural. 0536 Time out completed.   0546 catheter placed. 0547 test dose given.  Epidural catheter taped and secured per anesthesia.   0553 to low fowlers with left uterine displacement. 0548 loading dose given. 0555 pump initiated.  Pt tolerated procedure well.

## 2025-01-03 NOTE — PROGRESS NOTES
Labor Progress Note    Jaki Waters is a 19 y.o. female  at 40w1d  The patient was seen and examined. Her pain is well controlled. She reports fetal movement is present, complains of contractions, denies loss of fluid, denies vaginal bleeding.       Srivastava balloon placement was discussed with the patient. I have discussed the risk and benefits of this procedure and the alternatives with the patient including risk of AROM, infection, and emergent C/S. Patient agreeable and all questions answered to their satisfaction.     Vital Signs:  Vitals:    25 1243 25 1246 25 1802 25 1945   BP: 125/74  126/65 129/74   Pulse: 90  81 86   Resp: 16  16 16   Temp: 98.1 °F (36.7 °C)  98.2 °F (36.8 °C)    TempSrc: Oral  Oral    Weight:  58.5 kg (129 lb)     Height:  1.575 m (5' 2\")         FHT:  145 , moderate variability, accelerations present, decelerations absent  Contractions: regular, every 2 minutes    Chaperone for Intimate Exam: Chaperone was present for entire exam, Chaperone Name: Gris HERNANDEZ  Cervical Exam: 1 cm dilated, 70 effaced, -1 station  Pitocin: @ 0 mu/min    Membranes: Intact  Scalp Electrode in place: absent  Intrauterine Pressure Catheter in Place: absent    Interventions: SVE and Srivastava balloon placement     Assessment/Plan:  Jaki Waters is a 19 y.o. female  at 40w1d admitted for IOL @ term    - GBS negative, No indication for GBS prophylaxis   - VSS, afebrile    - Cat 1 FHT and TOCO showing regular contractions    - Membranes Intact    - SVE 1/70%/-1   - cEFM and TOCO   - Srivastava balloon placed and patient tolerated it well    - S/p Cytotec 25 mcg PO x3   - S/p Morphine/Compazine x1   - Continue to monitor     Attending updated and in agreement with plan    Liza Blevins MD  Ob/Gyn Resident  2025, 11:43 PM

## 2025-01-03 NOTE — FLOWSHEET NOTE
Patient admitted to room 741 from L&D via w/c.   Oriented to room and surroundings.  Plan of care reviewed.  Verbalized understanding.  Instructed on infant security and safe sleep practices.  Preventing falls education provided .The following handouts given: A New Beginning: Your Guide to Postpartum Care, Rounding, [facility],Babies Cry A lot, Safe Sleep, Security and Visitation Guidelines.   Call light placed within reach.

## 2025-01-03 NOTE — CARE COORDINATION
Case Management    CHIQUIS attempted to meet maurice Pollack at her bedside, however, she was asleep

## 2025-01-03 NOTE — ANESTHESIA PRE PROCEDURE
DRAINAGE ANTERIOR NECK Left 10/05/2022    NECK INCISION AND DRAINAGE- LEFT    NECK SURGERY Left 10/5/2022    NECK INCISION AND DRAINAGE- LEFT performed by Reed Weir MD at [facility] OR       Social History:    Social History     Tobacco Use    Smoking status: Never    Smokeless tobacco: Never    Tobacco comments:     Denied vaping 7/1024   Substance Use Topics    Alcohol use: Not Currently     Alcohol/week: 2.0 standard drinks of alcohol     Types: 2 Drinks containing 0.5 oz of alcohol per week                                Counseling given: Not Answered  Tobacco comments: Denied vaping 7/1024      Vital Signs (Current):   Vitals:    01/02/25 1246 01/02/25 1802 01/02/25 1945 01/03/25 0223   BP:  126/65 129/74 118/78   Pulse:  81 86 69   Resp:  16 16 16   Temp:  36.8 °C (98.2 °F)  36.6 °C (97.9 °F)   TempSrc:  Oral  Oral   Weight: 58.5 kg (129 lb)      Height: 1.575 m (5' 2\")                                                 BP Readings from Last 3 Encounters:   01/03/25 118/78   01/02/25 (!) 139/91   12/30/24 117/81       NPO Status:                                                                                 BMI:   Wt Readings from Last 3 Encounters:   01/02/25 58.5 kg (129 lb)   01/02/25 58.5 kg (129 lb)   12/24/24 58.1 kg (128 lb 1.4 oz)     Body mass index is 23.59 kg/m².    CBC:   Lab Results   Component Value Date/Time    WBC 8.1 01/02/2025 01:44 PM    RBC 3.99 01/02/2025 01:44 PM    HGB 12.1 01/02/2025 01:44 PM    HCT 36.8 01/02/2025 01:44 PM    MCV 92.2 01/02/2025 01:44 PM    RDW 12.9 01/02/2025 01:44 PM     01/02/2025 01:44 PM       CMP:   Lab Results   Component Value Date/Time     01/02/2025 01:44 PM    K 3.6 01/02/2025 01:44 PM     01/02/2025 01:44 PM    CO2 18 01/02/2025 01:44 PM    BUN 7 01/02/2025 01:44 PM    CREATININE 0.5 01/02/2025 01:44 PM    LABGLOM >90 01/02/2025 01:44 PM    LABGLOM >60 02/05/2024 11:00 AM    GLUCOSE 94 01/02/2025 01:44 PM    CALCIUM 9.0 01/02/2025 01:44  PM    BILITOT 0.4 01/02/2025 01:44 PM    ALKPHOS 178 01/02/2025 01:44 PM    AST 26 01/02/2025 01:44 PM    ALT 16 01/02/2025 01:44 PM       POC Tests:   Recent Labs     01/02/25  1707   POCGLU 103       Coags:   Lab Results   Component Value Date/Time    PROTIME 10.2 09/26/2022 11:52 PM    INR 1.0 09/26/2022 11:52 PM    APTT 25.9 09/26/2022 11:52 PM       HCG (If Applicable):   Lab Results   Component Value Date    PREGTESTUR NEGATIVE 04/15/2024    PREGSERUM NEGATIVE 10/04/2022    HCGQUANT 25,744.0 (H) 07/23/2024        ABGs: No results found for: \"PHART\", \"PO2ART\", \"NXB6WQU\", \"CDK3JOE\", \"BEART\", \"S2EMBWJW\"     Type & Screen (If Applicable):  Lab Results   Component Value Date    ABORH O POSITIVE 01/02/2025    LABANTI NEGATIVE 01/02/2025       Drug/Infectious Status (If Applicable):  Lab Results   Component Value Date/Time    HEPCAB NONREACTIVE 06/10/2024 11:15 AM       COVID-19 Screening (If Applicable):   Lab Results   Component Value Date/Time    COVID19 Not Detected 09/22/2024 02:08 PM           Anesthesia Evaluation  Patient summary reviewed  Airway: Mallampati: II  TM distance: >3 FB   Neck ROM: full  Mouth opening: > = 3 FB   Dental: normal exam         Pulmonary:Negative Pulmonary ROS and normal exam                               Cardiovascular:Negative CV ROS                      Neuro/Psych:   Negative Neuro/Psych ROS              GI/Hepatic/Renal: Neg GI/Hepatic/Renal ROS            Endo/Other:    (+) Diabetes.                 Abdominal: normal exam            Vascular: negative vascular ROS.         Other Findings:             Anesthesia Plan      epidural     ASA 2             Anesthetic plan and risks discussed with patient.              Post-op pain plan if not by surgeon: continuous epidural            ZACHARY Green - CRNA   1/3/2025

## 2025-01-03 NOTE — L&D DELIVERY NOTE
Kyle Waters [[id number]]      Labor Events     Labor: No   Steroids: None  Cervical Ripening Date/Time:      Antibiotics Received during Labor: Yes  Rupture Identifier: Sac 1  Rupture Date/Time:  1/3/25 06:14:00   Rupture Type: AROM  Fluid Color: Clear  Fluid Odor: None  Fluid Volume: Scant  Induction: AROM, Srivastava Bulb (Balloon), Misoprostol, Oxytocin  Labor Complications: None       Anesthesia    Method: Epidural       Labor Event Times      Labor onset date/time:        Dilation complete date/time:  1/3/25 07:29:00     Start pushing date/time:  1/3/2025 07:39:37   Decision date/time (emergent ):            Delivery Details      Delivery Date: 1/3/25 Delivery Time: 07:55:00   Delivery Type: Vaginal, Spontaneous               Presentation    Presentation: Compound  _: Occiput       Shoulder Dystocia    Shoulder Dystocia Present?: No       Assisted Delivery Details    Forceps Attempted?: No  Vacuum Extractor Attempted?: No                           Cord    Vessels: 3 Vessels  Complications: None  Delayed Cord Clamping?: Yes  Cord Clamped Date/Time: 1/3/2025 07:56:00  Cord Blood Disposition: Lab  Gases Sent?: Yes              Placenta    Date/Time: 1/3/2025 08:00:00  Removal: Spontaneous  Appearance: Intact  Disposition: Discarded       Lacerations    Episiotomy: None  Perineal Lacerations: None  Other Lacerations: sulcus laceration  Sulcus Laceration: left Repaired?: Yes   Number of Repair Packets: 1       Vaginal Counts    Initial Count Personnel: MARY RICHARDSON  Initial Count Verified By: DR. CHINCHILLA  Intial Sponge Count: Correct Intial Needles Count: Correct Intial Instruments Count: Correct   Final Sponges Count: Correct Final Needles  Count: Correct Final Instruments Count: Correct   Final Count Personnel: MARIELENA  Final Count Verified By: DR. FALLON  Accurate Final Count?: Yes       Blood Loss  Mother: Jaki Waters #[id number]     Start of Mother's Information      Delivery  Obstetrician & Assistant(s): Dr Melia Phillips DO; Silvana Do DO PGY4    Infant Information:   Information for the patient's :  Kyle Waters [[id number]]      Information for the patient's :  Kyle Waters [[id number]]        Apgar scores: 8 at 1 minute and 9 at 5 minutes.     Anesthesia:  epidural anesthesia    Application and Delivery:    She was known to be GBS negative.    The patient progressed well, received an epidural, became complete and felt the urge to push. After pushing with contractions the fetal head delivered Cephalic, occiput anterior with compound hand presentation over an intact perineum, nuchal cord was not present.  The anterior, then posterior shoulder delivered easily and atraumatically followed by the rest of the infant. Nose and mouth suctioned with bulb suction, infant was stimulated and dried. Cord was clamped and cut after one minute delayed cord clamping  and infant was placed on maternal abdomen, and attended by RN for evaluation. The delivery of the placenta was spontaneous and appeared intact, whole, and that the umbilical cord had three vessels noted. Pitocin was started. The vagina was swept of all clots and debris. The perineum and vagina were evaluated and a  left sulcal laceration was found and repaired in standard fashion with 3-0 vicryl. Bilateral deena-urethral lacerations were found and hemostatic not requiring repair. A small non-expanding hematoma was noted on the perineum. Will continue to monitor expectantly. Mother and baby tolerated procedure well.     Dr. Phillips was present for entire delivery.    Delivery Summary:       Specimen: placenta (discarded), cord blood, and cord gases  Quantitative blood loss:  200ml immediately following delivery  Condition:  infant stable to general nursery and mother stable  Counts: instrument and sponge counts correct  Blood Type and Rh: O POSITIVE    Rubella Immunity Status: immune  Infant Feeding:

## 2025-01-03 NOTE — PROGRESS NOTES
Labor Progress Note    Jaki Waters is a 19 y.o. female  at 40w2d  The patient was seen and examined. Her pain is well controlled. She reports fetal movement is present, complains of contractions, complains of loss of fluid, denies vaginal bleeding.       Discussed AROM w/ patient and R/B/A. Patient comfortable with epidural and amenable to AROM. AROM performed. Mom and baby tolerated well.    Vital Signs:  Vitals:    25 0557 25 0600 25 0605 25 0616   BP: (!) 106/52 (!) 109/59 105/60 111/69   Pulse: (!) 113 (!) 112 (!) 101 98   Resp: 18 18 18 18   Temp:       TempSrc:       SpO2:  98% 99%    Weight:       Height:             FHT: 120, moderate variability, accelerations present, decelerations intermittent variable decelerations  Contractions: regular, every 5-7 minutes    Chaperone for Intimate Exam: Chaperone was present for entire exam, Chaperone Name: MARY Landry   Cervical Exam: 5 cm dilated, 90 effaced, 0 station  Pitocin: @ 0 mu/min    Membranes: Ruptured clear fluid  Scalp Electrode in place: absent  Intrauterine Pressure Catheter in Place: absent    Interventions: AROM clear fluid    Assessment/Plan:  Jaki Waters is a 19 y.o. female  at 40w2d admitted for IOL @ term    - GBS negative, No indication for GBS prophylaxis   - SVE: 5, 90, 0    - AROM clear fluid    - S/p cytotec 25 PO xx3    - Pitocin ordered   - Epidural in place and functioning   - Continue current management      Melia Phillips DO  Ob/Gyn Resident  1/3/2025, 6:16 AM

## 2025-01-04 VITALS
WEIGHT: 129 LBS | TEMPERATURE: 98.1 F | HEIGHT: 62 IN | SYSTOLIC BLOOD PRESSURE: 125 MMHG | DIASTOLIC BLOOD PRESSURE: 79 MMHG | RESPIRATION RATE: 16 BRPM | BODY MASS INDEX: 23.74 KG/M2 | OXYGEN SATURATION: 98 % | HEART RATE: 97 BPM

## 2025-01-04 PROBLEM — Z3A.40 40 WEEKS GESTATION OF PREGNANCY: Status: RESOLVED | Noted: 2025-01-02 | Resolved: 2025-01-04

## 2025-01-04 PROBLEM — O14.90 PREECLAMPSIA: Status: ACTIVE | Noted: 2025-01-02

## 2025-01-04 LAB
ALBUMIN SERPL-MCNC: 2.6 G/DL (ref 3.5–5.2)
ALBUMIN/GLOB SERPL: 1.2 {RATIO} (ref 1–2.5)
ALP SERPL-CCNC: 137 U/L (ref 35–104)
ALT SERPL-CCNC: 18 U/L (ref 10–35)
ANION GAP SERPL CALCULATED.3IONS-SCNC: 8 MMOL/L (ref 9–16)
AST SERPL-CCNC: 37 U/L (ref 10–35)
BASOPHILS # BLD: <0.03 K/UL (ref 0–0.2)
BASOPHILS NFR BLD: 0 % (ref 0–2)
BILIRUB SERPL-MCNC: 0.2 MG/DL (ref 0–1.2)
BUN SERPL-MCNC: 3 MG/DL (ref 6–20)
CALCIUM SERPL-MCNC: 8.4 MG/DL (ref 8.6–10.4)
CHLORIDE SERPL-SCNC: 109 MMOL/L (ref 98–107)
CO2 SERPL-SCNC: 19 MMOL/L (ref 20–31)
CREAT SERPL-MCNC: 0.5 MG/DL (ref 0.6–0.9)
CREAT UR-MCNC: 12.3 MG/DL (ref 28–217)
EOSINOPHIL # BLD: 0.1 K/UL (ref 0–0.44)
EOSINOPHILS RELATIVE PERCENT: 1 % (ref 1–4)
ERYTHROCYTE [DISTWIDTH] IN BLOOD BY AUTOMATED COUNT: 13 % (ref 11.8–14.4)
GFR, ESTIMATED: >90 ML/MIN/1.73M2
GLUCOSE SERPL-MCNC: 78 MG/DL (ref 74–99)
HCT VFR BLD AUTO: 31.3 % (ref 36.3–47.1)
HGB BLD-MCNC: 10.1 G/DL (ref 11.9–15.1)
IMM GRANULOCYTES # BLD AUTO: 0.11 K/UL (ref 0–0.3)
IMM GRANULOCYTES NFR BLD: 1 %
LYMPHOCYTES NFR BLD: 1.45 K/UL (ref 1.2–5.2)
LYMPHOCYTES RELATIVE PERCENT: 16 % (ref 25–45)
MCH RBC QN AUTO: 30.2 PG (ref 25.2–33.5)
MCHC RBC AUTO-ENTMCNC: 32.3 G/DL (ref 28.4–34.8)
MCV RBC AUTO: 93.7 FL (ref 82.6–102.9)
MONOCYTES NFR BLD: 0.61 K/UL (ref 0.1–1.4)
MONOCYTES NFR BLD: 7 % (ref 2–8)
NEUTROPHILS NFR BLD: 75 % (ref 34–64)
NEUTS SEG NFR BLD: 7.05 K/UL (ref 1.8–8)
NRBC BLD-RTO: 0 PER 100 WBC
PLATELET # BLD AUTO: 147 K/UL (ref 138–453)
PMV BLD AUTO: 10.4 FL (ref 8.1–13.5)
POTASSIUM SERPL-SCNC: 3.6 MMOL/L (ref 3.7–5.3)
PROT SERPL-MCNC: 4.8 G/DL (ref 6.6–8.7)
RBC # BLD AUTO: 3.34 M/UL (ref 3.95–5.11)
SODIUM SERPL-SCNC: 136 MMOL/L (ref 136–145)
TOTAL PROTEIN, URINE: 15 MG/DL
URINE TOTAL PROTEIN CREATININE RATIO: 1.22 (ref 0–0.2)
WBC OTHER # BLD: 9.3 K/UL (ref 4.5–13.5)

## 2025-01-04 PROCEDURE — 6370000000 HC RX 637 (ALT 250 FOR IP): Performed by: STUDENT IN AN ORGANIZED HEALTH CARE EDUCATION/TRAINING PROGRAM

## 2025-01-04 PROCEDURE — 85025 COMPLETE CBC W/AUTO DIFF WBC: CPT

## 2025-01-04 PROCEDURE — 82570 ASSAY OF URINE CREATININE: CPT

## 2025-01-04 PROCEDURE — 2500000003 HC RX 250 WO HCPCS: Performed by: STUDENT IN AN ORGANIZED HEALTH CARE EDUCATION/TRAINING PROGRAM

## 2025-01-04 PROCEDURE — 80053 COMPREHEN METABOLIC PANEL: CPT

## 2025-01-04 PROCEDURE — 6370000000 HC RX 637 (ALT 250 FOR IP)

## 2025-01-04 PROCEDURE — 36415 COLL VENOUS BLD VENIPUNCTURE: CPT

## 2025-01-04 PROCEDURE — 84156 ASSAY OF PROTEIN URINE: CPT

## 2025-01-04 RX ORDER — ACETAMINOPHEN 500 MG
1000 TABLET ORAL EVERY 6 HOURS PRN
Qty: 80 TABLET | Refills: 1 | Status: SHIPPED | OUTPATIENT
Start: 2025-01-04 | End: 2025-02-03

## 2025-01-04 RX ADMIN — ACETAMINOPHEN 1000 MG: 500 TABLET ORAL at 05:38

## 2025-01-04 RX ADMIN — SENNOSIDES AND DOCUSATE SODIUM 1 TABLET: 50; 8.6 TABLET ORAL at 10:01

## 2025-01-04 RX ADMIN — IBUPROFEN 600 MG: 600 TABLET, FILM COATED ORAL at 03:31

## 2025-01-04 RX ADMIN — IBUPROFEN 600 MG: 600 TABLET, FILM COATED ORAL at 10:01

## 2025-01-04 RX ADMIN — SODIUM CHLORIDE, PRESERVATIVE FREE 10 ML: 5 INJECTION INTRAVENOUS at 10:00

## 2025-01-04 RX ADMIN — CYCLOBENZAPRINE 10 MG: 10 TABLET, FILM COATED ORAL at 00:11

## 2025-01-04 RX ADMIN — ACETAMINOPHEN 1000 MG: 500 TABLET ORAL at 12:20

## 2025-01-04 ASSESSMENT — PAIN DESCRIPTION - ORIENTATION
ORIENTATION: LEFT;RIGHT
ORIENTATION: MID;LOWER
ORIENTATION: LOWER;MID
ORIENTATION: LEFT;RIGHT
ORIENTATION: LEFT;RIGHT

## 2025-01-04 ASSESSMENT — PAIN SCALES - GENERAL
PAINLEVEL_OUTOF10: 6
PAINLEVEL_OUTOF10: 6
PAINLEVEL_OUTOF10: 3
PAINLEVEL_OUTOF10: 3
PAINLEVEL_OUTOF10: 4

## 2025-01-04 ASSESSMENT — PAIN DESCRIPTION - DESCRIPTORS
DESCRIPTORS: SORE;TENDER
DESCRIPTORS: SORE
DESCRIPTORS: SORE;CRAMPING
DESCRIPTORS: SORE
DESCRIPTORS: SORE;CRAMPING;DISCOMFORT

## 2025-01-04 ASSESSMENT — PAIN - FUNCTIONAL ASSESSMENT
PAIN_FUNCTIONAL_ASSESSMENT: ACTIVITIES ARE NOT PREVENTED

## 2025-01-04 ASSESSMENT — PAIN DESCRIPTION - LOCATION
LOCATION: ABDOMEN;LEG
LOCATION: LEG;PERINEUM
LOCATION: ABDOMEN;PERINEUM
LOCATION: ABDOMEN;PERINEUM
LOCATION: PERINEUM;LEG

## 2025-01-04 NOTE — CARE COORDINATION
CASE MANAGEMENT POST-PARTUM TRANSITIONAL CARE PLAN    40 weeks gestation of pregnancy [Z3A.40]    OB Provider: Dr Martinez    Writer met bruno/ Jaki at her bedside to discuss DCP. She is S/P  on 1/3/2025    Writer verified address,her phone number and emergency contacts are all correct on facesheet.. She states she lives with FOB/BF Tr Echevarria and her mother. Jaki denied barriers with transportation home, to doctor's appointments or with paying for medications upon discharge home.     [employer] insurance correct. Writer notified Jaki she has 30 days from date of birth to add  to insurance policy. She verbalized understanding.    Infant name on BC: Сергей Echevarria.   Infant PCP Sarina SHARMA.     DME: gDM supplies - no longer needed  HOME CARE: no    Anticipated DC of mother and infant 1-2 days after .     [facility] RISK OF UNPLANNED READMISSION 2.0             7.9 Total Score

## 2025-01-04 NOTE — PROGRESS NOTES
POST PARTUM DAY # 1    Jaki Waters is a 19 y.o. female  This patient was seen & examined today.  on 1/3/25    Her pregnancy was complicated by:   Patient Active Problem List   Diagnosis    Scoliosis of thoracolumbar spine    Anorexia    BREECH (RSLVD)    Anxiety    Diet controlled gestational diabetes mellitus (GDM) in third trimester    Elevated blood pressure affecting pregnancy in third trimester, antepartum    40 weeks gestation of pregnancy     1/3/25 M Apg 8/9 Wt **       Today she is doing well without any chief complaint. Her lochia is light. She denies chest pain, shortness of breath, headache, lightheadedness and blurred vision. She is breast feeding and she denies any breast tenderness. She is ambulating well. Her voiding pattern is normal. I reviewed signs and symptoms of post partum depression with the patient, she currently denies any of these symptoms. She is tolerating solids.     Vital Signs:  Vitals:    25 1200 25 1505 25 2350   BP: 123/78 136/82 128/82 (!) 125/94   Pulse: 70 91 70 72   Resp: 16 18 16 18   Temp: 97.6 °F (36.4 °C) 98.2 °F (36.8 °C) 98.2 °F (36.8 °C) 97.9 °F (36.6 °C)   TempSrc: Oral Oral Oral Oral   SpO2:   99% 99%   Weight:       Height:           Physical Exam:  General:  no apparent distress, alert and cooperative  Neurologic:  alert, oriented, normal speech, no focal findings or movement disorder noted  Lungs:  No increased work of breathing   Heart:  regular rate   Abdomen: abdomen soft, non-distended, non-tender  Fundus: non-tender, firm, below umbilicus  Extremities:  no calf tenderness, non edematous    Lab:  Lab Results   Component Value Date    HGB 12.1 2025     Lab Results   Component Value Date    HCT 36.8 2025       Assessment/Plan:  Jaki Waters is a  PPD # 1 s/p    - Doing well, VSS   - Male infant in General Care Nursery, circumcision desired    - Encourage ambulation   - Motrin/Tylenol   -

## 2025-01-04 NOTE — PLAN OF CARE
Problem: Chronic Conditions and Co-morbidities  Goal: Patient's chronic conditions and co-morbidity symptoms are monitored and maintained or improved  Outcome: Completed     Problem: Postpartum  Goal: Experiences normal postpartum course  Description:  Postpartum OB-Pregnancy care plan goal which identifies if the mother is experiencing a normal postpartum course  Outcome: Completed  Goal: Appropriate maternal -  bonding  Description:  Postpartum OB-Pregnancy care plan goal which identifies if the mother and  are bonding appropriately  Outcome: Completed  Goal: Establishment of infant feeding pattern  Description:  Postpartum OB-Pregnancy care plan goal which identifies if the mother is establishing a feeding pattern with their   2025 by Hortencia Paige RN  Outcome: Completed  2025 by Matias Vegas RN  Outcome: Progressing  Goal: Incisions, wounds, or drain sites healing without S/S of infection  2025 by Hortencia Paige RN  Outcome: Completed  2025 by Matias Vegas RN  Outcome: Progressing     Problem: Pain  Goal: Verbalizes/displays adequate comfort level or baseline comfort level  2025 by Hortencia Paige RN  Outcome: Completed  2025 by Matias Vegas RN  Outcome: Progressing     Problem: Infection - Adult  Goal: Absence of infection at discharge  Outcome: Completed  Goal: Absence of infection during hospitalization  2025 by Hortencia Paige RN  Outcome: Completed  2025 by Matias Vegas RN  Outcome: Progressing  Goal: Absence of fever/infection during anticipated neutropenic period  Outcome: Completed     Problem: Safety - Adult  Goal: Free from fall injury  2025 by Hortencia Paige RN  Outcome: Completed  2025 by Matias Vegas RN  Outcome: Progressing     Problem: Discharge Planning  Goal: Discharge to home or other facility with appropriate resources  Outcome: Completed

## 2025-01-04 NOTE — PROGRESS NOTES
CLINICAL PHARMACY NOTE: MEDS TO BEDS    Total # of Prescriptions Filled: 3   The following medications were delivered to the patient:  Ibuprofen  Senexon-s  acetaminophen    Additional Documentation:

## 2025-01-04 NOTE — PLAN OF CARE
Problem: Postpartum  Goal: Establishment of infant feeding pattern  Description:  Postpartum OB-Pregnancy care plan goal which identifies if the mother is establishing a feeding pattern with their   Outcome: Progressing  Goal: Incisions, wounds, or drain sites healing without S/S of infection  Outcome: Progressing     Problem: Pain  Goal: Verbalizes/displays adequate comfort level or baseline comfort level  Outcome: Progressing     Problem: Infection - Adult  Goal: Absence of infection during hospitalization  Outcome: Progressing     Problem: Safety - Adult  Goal: Free from fall injury  Outcome: Progressing

## 2025-01-04 NOTE — LACTATION NOTE
Pt doing both breast and formula feeding, denies pain or difficulty with latch. If baby does not feed at breast and a bottle is given, encouraged to replace those feeds with pumping session. Baby was circumcised this morning, discussed post circ behavior and benefits of skin to skin after the procedure.  Discharge instructions and LC follow up reviewed.

## 2025-01-04 NOTE — FLOWSHEET NOTE
Writer sat with pt and partner, Tr, and went over discharge paperwork and education. Pt knows that she needs to schedule a follow up appointment with her OB in 3 days for a BP check. Pt aware of the medications that she needs to be taking and how to take them. Post birth warning signs education complete. Pt provided with blood pressure cuff and log sheet on which to record daily readings. Pt educated to contact OB provider if SBP = 140-159 or DBP =  and to seek immediate medical attention if SBP = 160 or higher or DBP = 110 or higher. IV removed. Enough supplies given to patient for a couple of days. Pt doesn't have any questions or concerns at this time. Pt left unit at 1433 with all belongings, ambulating, accompanied by baby strapped into car seat and partner. Pt declined WC and to be walked to car by a staff member.

## 2025-01-07 NOTE — ANESTHESIA POSTPROCEDURE EVALUATION
Department of Anesthesiology  Postprocedure Note    Patient: Jaki Waters  MRN: [id number]  YOB: 2005  Date of evaluation: 1/7/2025    Procedure Summary       Date: 01/03/25 Room / Location:     Anesthesia Start: 0532 Anesthesia Stop: 0755    Procedure: Labor Analgesia Diagnosis:     Scheduled Providers:  Responsible Provider: Felisa Bansal MD    Anesthesia Type: epidural ASA Status: 2            Anesthesia Type: No value filed.    [place] Phase I:      [place] Phase II:      Anesthesia Post Evaluation    Patient location during evaluation: bedside  Patient participation: complete - patient participated  Level of consciousness: awake  Airway patency: patent  Nausea & Vomiting: no nausea and no vomiting  Cardiovascular status: blood pressure returned to baseline  Respiratory status: acceptable  Hydration status: euvolemic  Pain management: adequate    No notable events documented.

## 2025-01-10 ENCOUNTER — OFFICE VISIT (OUTPATIENT)
Dept: OBGYN CLINIC | Age: 20
End: 2025-01-10

## 2025-01-10 VITALS
HEART RATE: 90 BPM | WEIGHT: 116 LBS | DIASTOLIC BLOOD PRESSURE: 80 MMHG | SYSTOLIC BLOOD PRESSURE: 126 MMHG | BODY MASS INDEX: 21.22 KG/M2

## 2025-01-10 DIAGNOSIS — O14.90 PRE-ECLAMPSIA, ANTEPARTUM: Primary | ICD-10-CM

## 2025-01-10 NOTE — PROGRESS NOTES
BP was 126/80 today and her BP logs were reviewed and none were elevated >140 systolic or >90 diastolic.  Pt to follow up for PP visit next week.

## 2025-01-21 ENCOUNTER — HOSPITAL ENCOUNTER (EMERGENCY)
Facility: CLINIC | Age: 20
Discharge: HOME | End: 2025-01-21
Attending: SPECIALIST
Payer: COMMERCIAL

## 2025-01-21 VITALS
TEMPERATURE: 97.7 F | OXYGEN SATURATION: 99 % | RESPIRATION RATE: 15 BRPM | WEIGHT: 113 LBS | HEIGHT: 62 IN | BODY MASS INDEX: 20.8 KG/M2 | HEART RATE: 103 BPM | SYSTOLIC BLOOD PRESSURE: 103 MMHG | DIASTOLIC BLOOD PRESSURE: 54 MMHG

## 2025-01-21 DIAGNOSIS — N61.0 ACUTE MASTITIS OF LEFT BREAST: Primary | ICD-10-CM

## 2025-01-21 DIAGNOSIS — U07.1 COVID-19 VIRUS INFECTION: ICD-10-CM

## 2025-01-21 LAB
ANION GAP SERPL CALCULATED.3IONS-SCNC: 14 MMOL/L (ref 9–16)
BASOPHILS # BLD: 0 K/UL (ref 0–0.2)
BASOPHILS NFR BLD: 0 % (ref 0–2)
BUN SERPL-MCNC: 20 MG/DL (ref 6–20)
CALCIUM SERPL-MCNC: 10.3 MG/DL (ref 8.6–10.4)
CHLORIDE SERPL-SCNC: 102 MMOL/L (ref 98–107)
CO2 SERPL-SCNC: 21 MMOL/L (ref 20–31)
CREAT SERPL-MCNC: 0.8 MG/DL (ref 0.5–0.9)
EOSINOPHIL # BLD: 0.1 K/UL (ref 0–0.4)
EOSINOPHILS RELATIVE PERCENT: 1 % (ref 1–4)
ERYTHROCYTE [DISTWIDTH] IN BLOOD BY AUTOMATED COUNT: 13.6 % (ref 12.5–15.4)
FLUAV AG SPEC QL: NEGATIVE
FLUBV AG SPEC QL: NEGATIVE
GFR, ESTIMATED: >90 ML/MIN/1.73M2
GLUCOSE SERPL-MCNC: 114 MG/DL (ref 74–99)
HCT VFR BLD AUTO: 38.8 % (ref 36–46)
HGB BLD-MCNC: 13.1 G/DL (ref 12–16)
LYMPHOCYTES NFR BLD: 0.6 K/UL (ref 1.2–5.2)
LYMPHOCYTES RELATIVE PERCENT: 6 % (ref 25–45)
MCH RBC QN AUTO: 30.3 PG (ref 26–34)
MCHC RBC AUTO-ENTMCNC: 33.7 G/DL (ref 31–37)
MCV RBC AUTO: 89.9 FL (ref 80–100)
MONOCYTES NFR BLD: 0.7 K/UL (ref 0.1–1.4)
MONOCYTES NFR BLD: 7 % (ref 2–8)
NEUTROPHILS NFR BLD: 86 % (ref 34–64)
NEUTS SEG NFR BLD: 8.6 K/UL (ref 1.8–8)
PLATELET # BLD AUTO: 231 K/UL (ref 140–450)
PMV BLD AUTO: 7.8 FL (ref 6–12)
POTASSIUM SERPL-SCNC: 3.9 MMOL/L (ref 3.7–5.3)
RBC # BLD AUTO: 4.31 M/UL (ref 4–5.2)
SARS-COV-2 RDRP RESP QL NAA+PROBE: DETECTED
SODIUM SERPL-SCNC: 137 MMOL/L (ref 136–145)
SPECIMEN DESCRIPTION: ABNORMAL
WBC OTHER # BLD: 10 K/UL (ref 4.5–13.5)

## 2025-01-21 PROCEDURE — 2580000003 HC RX 258: Performed by: SPECIALIST

## 2025-01-21 PROCEDURE — 87804 INFLUENZA ASSAY W/OPTIC: CPT

## 2025-01-21 PROCEDURE — 6360000002 HC RX W HCPCS: Performed by: SPECIALIST

## 2025-01-21 PROCEDURE — 80048 BASIC METABOLIC PNL TOTAL CA: CPT

## 2025-01-21 PROCEDURE — 36415 COLL VENOUS BLD VENIPUNCTURE: CPT

## 2025-01-21 PROCEDURE — 96374 THER/PROPH/DIAG INJ IV PUSH: CPT

## 2025-01-21 PROCEDURE — 99284 EMERGENCY DEPT VISIT MOD MDM: CPT

## 2025-01-21 PROCEDURE — 2500000003 HC RX 250 WO HCPCS: Performed by: SPECIALIST

## 2025-01-21 PROCEDURE — 87635 SARS-COV-2 COVID-19 AMP PRB: CPT

## 2025-01-21 PROCEDURE — 85025 COMPLETE CBC W/AUTO DIFF WBC: CPT

## 2025-01-21 RX ORDER — 0.9 % SODIUM CHLORIDE 0.9 %
1000 INTRAVENOUS SOLUTION INTRAVENOUS ONCE
Status: COMPLETED | OUTPATIENT
Start: 2025-01-21 | End: 2025-01-21

## 2025-01-21 RX ORDER — CEPHALEXIN 500 MG/1
500 CAPSULE ORAL 4 TIMES DAILY
Qty: 40 CAPSULE | Refills: 0 | Status: SHIPPED | OUTPATIENT
Start: 2025-01-21 | End: 2025-01-31

## 2025-01-21 RX ADMIN — SODIUM CHLORIDE 1000 ML: 9 INJECTION, SOLUTION INTRAVENOUS at 19:36

## 2025-01-21 RX ADMIN — WATER 1000 MG: 1 INJECTION INTRAMUSCULAR; INTRAVENOUS; SUBCUTANEOUS at 19:38

## 2025-01-21 ASSESSMENT — PAIN DESCRIPTION - FREQUENCY: FREQUENCY: CONTINUOUS

## 2025-01-21 ASSESSMENT — PAIN DESCRIPTION - LOCATION: LOCATION: HEAD

## 2025-01-21 ASSESSMENT — PAIN DESCRIPTION - PAIN TYPE: TYPE: ACUTE PAIN

## 2025-01-21 ASSESSMENT — ENCOUNTER SYMPTOMS
SORE THROAT: 0
COLOR CHANGE: 1
WHEEZING: 0
COUGH: 1
RHINORRHEA: 1
SHORTNESS OF BREATH: 0
ABDOMINAL PAIN: 0
VOMITING: 0
DIARRHEA: 0

## 2025-01-21 ASSESSMENT — PAIN - FUNCTIONAL ASSESSMENT: PAIN_FUNCTIONAL_ASSESSMENT: 0-10

## 2025-01-21 ASSESSMENT — PAIN DESCRIPTION - DESCRIPTORS: DESCRIPTORS: ACHING

## 2025-01-21 ASSESSMENT — PAIN SCALES - GENERAL: PAINLEVEL_OUTOF10: 6

## 2025-01-21 ASSESSMENT — PAIN DESCRIPTION - ORIENTATION: ORIENTATION: ANTERIOR

## 2025-01-22 NOTE — DISCHARGE INSTRUCTIONS
Please understand that at this time there is no evidence for a more serious underlying process, but that early in the process of an illness or injury, an emergency department workup can be falsely reassuring.  You should contact your family doctor within the next 48 hours for a follow up appointment    THANK YOU!!!    From [facility] and [facility]    On behalf of the Emergency Department staff at [facility], I would like to thank you for giving us the opportunity to address your health care needs and concerns.    We hope that during your visit, our service was delivered in a professional and caring manner. Please keep [facility] in mind as we walk with you down the path to your own personal wellness.     Please expect an automated text message or email from us so we can ask a few questions about your health and progress. Based on your answers, a clinician may call you back to offer help and instructions.    Please understand that early in the process of an illness or injury, an emergency department workup can be falsely reassuring.  If you notice any worsening, changing or persistent symptoms please call your family doctor or return to the ER immediately.     Tell us how we did during your visit at [email or web address]   and let us know about your experience

## 2025-01-22 NOTE — ED NOTES
Pt presents t the ED via private auto accompanied by her mother.  Pt states she has been experiencing a sinus type headache x1 day, and a low grade fever ( 100 temporal) x1 day. Pt took Motrin last 2 hours prior to arrival. Pt indicated that she was 2 weeks post-partum, breast feeding and is now experiencing let breast redness and tenderness

## 2025-01-22 NOTE — ED PROVIDER NOTES
[facility]  [address] [address]  Phone: [phone]      Pt Name: Jaki Waters  MRN: [id number]  Birthdate 2005  Date of evaluation: 2025      CHIEF COMPLAINT       Chief Complaint   Patient presents with    Fever     100    Headache     Sinus type x1 day         HISTORY OF PRESENT ILLNESS    Jaki Waters is a 19 y.o. female who presents   Chief Complaint   Patient presents with    Fever     100    Headache     Sinus type x1 day   .    19-year-old female patient presents to the emergency department accompanied by her mother for evaluation of fever since 10 AM this morning as well as sinus type headache.  She has mild runny nose and cough.  She has taken Motrin 600 mg at 5 PM and she is afebrile upon arrival.  Patient is 2 weeks postpartum and is breast-feeding.  She has noticed some redness, swelling and tenderness in the left breast area.  She denies any sore throat, earache, abdominal pain, vomiting or diarrhea and denies any urinary symptoms.  She also denies any abnormal vaginal discharge.  The breast tenderness started 2 days ago.    REVIEW OF SYSTEMS     Review of Systems   Constitutional:  Positive for fever.   HENT:  Positive for rhinorrhea. Negative for congestion and sore throat.    Respiratory:  Positive for cough. Negative for shortness of breath and wheezing.    Cardiovascular:  Negative for chest pain and palpitations.   Gastrointestinal:  Negative for abdominal pain, diarrhea and vomiting.   Genitourinary:  Negative for dysuria and frequency.   Skin:  Positive for color change.        Pain, redness and swelling left breast   Neurological:  Positive for headaches. Negative for dizziness and light-headedness.   All other systems reviewed and are negative.      PAST MEDICAL HISTORY    has a past medical history of Breast disorder, Diet controlled gestational diabetes mellitus (GDM) in third trimester, Scoliosis, and  1/3/25 M Apg  Wt

## 2025-02-10 ENCOUNTER — POSTPARTUM VISIT (OUTPATIENT)
Dept: OBGYN CLINIC | Age: 20
End: 2025-02-10
Payer: COMMERCIAL

## 2025-02-10 VITALS
WEIGHT: 110.4 LBS | BODY MASS INDEX: 20.19 KG/M2 | HEART RATE: 65 BPM | DIASTOLIC BLOOD PRESSURE: 74 MMHG | SYSTOLIC BLOOD PRESSURE: 115 MMHG

## 2025-02-10 RX ORDER — ACETAMINOPHEN AND CODEINE PHOSPHATE 120; 12 MG/5ML; MG/5ML
1 SOLUTION ORAL DAILY
Qty: 30 TABLET | Refills: 5 | Status: SHIPPED | OUTPATIENT
Start: 2025-02-10

## 2025-02-10 NOTE — PROGRESS NOTES
1/3/2025   - Doing well, continue routine post partum care   - BP normotensive, denies s/sx PreE   - EPDS: 7   - Lochia: Light   - Breast feeding, denies s/sx mastitis.  She did have 1 episode of mastitis, however it has improved   - Family planning: Desires STACEY's-discussed estrogen with breast-feeding, patient is okay with Micronor for now and will switch over   - Gardasil vaccination: Completed series   - Last pap smear: Not applicable due to age   - Indications for 2hr 75g GTT: Not applicable    Patient Active Problem List    Diagnosis Date Noted     1/3/25 M Apg 8/9 Wt 7#11 2025     Priority: High    Scoliosis of thoracolumbar spine 10/05/2022     Priority: Medium     Anesthesia Referral sent 24       Anorexia 10/05/2022     Priority: Medium    PreE without SF (G2) 2025     Elevated blood pressure at 40 weeks   Diagnosed during admission for labor/pp      Diet controlled gestational diabetes mellitus (GDM) in third trimester 2024    Anxiety 10/23/2024     Return for Annual or sooner as needed.    Counseling Completed:  Signs & Symptoms of mastitis and when to notify office discussed.  Secondary smoke risks including increased risks of respiratory problems, Sudden infant death syndrome, and potential malignancies discussed  Abstinence, family planning counseling and STD counseling discussed  Patient cleared from postpartum restrictions    DO Alvino Nesbitt OB/GYN   2/10/2025, 1:34 PM

## 2025-09-04 ENCOUNTER — APPOINTMENT (OUTPATIENT)
Dept: GENERAL RADIOLOGY | Age: 20
End: 2025-09-04
Payer: COMMERCIAL

## 2025-09-04 ENCOUNTER — HOSPITAL ENCOUNTER (EMERGENCY)
Age: 20
Discharge: HOME | End: 2025-09-04
Attending: EMERGENCY MEDICINE
Payer: COMMERCIAL

## 2025-09-04 VITALS
TEMPERATURE: 97.9 F | HEIGHT: 62 IN | OXYGEN SATURATION: 99 % | RESPIRATION RATE: 16 BRPM | SYSTOLIC BLOOD PRESSURE: 120 MMHG | WEIGHT: 110 LBS | BODY MASS INDEX: 20.24 KG/M2 | HEART RATE: 77 BPM | DIASTOLIC BLOOD PRESSURE: 66 MMHG

## 2025-09-04 DIAGNOSIS — R51.9 ACUTE NONINTRACTABLE HEADACHE, UNSPECIFIED HEADACHE TYPE: Primary | ICD-10-CM

## 2025-09-04 DIAGNOSIS — N30.00 ACUTE CYSTITIS WITHOUT HEMATURIA: ICD-10-CM

## 2025-09-04 DIAGNOSIS — R11.0 NAUSEA: ICD-10-CM

## 2025-09-04 DIAGNOSIS — R07.81 RIB PAIN ON LEFT SIDE: ICD-10-CM

## 2025-09-04 LAB
ALBUMIN SERPL-MCNC: 4.1 G/DL (ref 3.5–5.2)
ALBUMIN/GLOB SERPL: 1.7 {RATIO} (ref 1–2.5)
ALP SERPL-CCNC: 73 U/L (ref 35–104)
ALT SERPL-CCNC: 10 U/L (ref 10–35)
ANION GAP SERPL CALCULATED.3IONS-SCNC: 10 MMOL/L (ref 9–16)
AST SERPL-CCNC: 17 U/L (ref 10–35)
BACTERIA URNS QL MICRO: ABNORMAL
BASOPHILS # BLD: <0.03 K/UL (ref 0–0.2)
BASOPHILS NFR BLD: 0 % (ref 0–2)
BILIRUB SERPL-MCNC: <0.2 MG/DL (ref 0–1.2)
BILIRUB UR QL STRIP: NEGATIVE
BUN SERPL-MCNC: 19 MG/DL (ref 6–20)
CALCIUM SERPL-MCNC: 9.1 MG/DL (ref 8.6–10.4)
CHLORIDE SERPL-SCNC: 107 MMOL/L (ref 98–107)
CLARITY UR: CLEAR
CO2 SERPL-SCNC: 22 MMOL/L (ref 20–31)
COLOR UR: YELLOW
CREAT SERPL-MCNC: 0.8 MG/DL (ref 0.5–0.9)
EOSINOPHIL # BLD: 0.11 K/UL (ref 0–0.44)
EOSINOPHILS RELATIVE PERCENT: 2 % (ref 1–4)
EPI CELLS #/AREA URNS HPF: ABNORMAL /HPF (ref 0–5)
ERYTHROCYTE [DISTWIDTH] IN BLOOD BY AUTOMATED COUNT: 12.3 % (ref 11.8–14.4)
GFR, ESTIMATED: >90 ML/MIN/1.73M2
GLUCOSE SERPL-MCNC: 81 MG/DL (ref 74–99)
GLUCOSE UR STRIP-MCNC: NEGATIVE MG/DL
HCG UR QL: NEGATIVE
HCT VFR BLD AUTO: 36.3 % (ref 36.3–47.1)
HGB BLD-MCNC: 12.5 G/DL (ref 11.9–15.1)
HGB UR QL STRIP.AUTO: NEGATIVE
IMM GRANULOCYTES # BLD AUTO: 0.02 K/UL (ref 0–0.3)
IMM GRANULOCYTES NFR BLD: 0 %
KETONES UR STRIP-MCNC: NEGATIVE MG/DL
LEUKOCYTE ESTERASE UR QL STRIP: ABNORMAL
LIPASE SERPL-CCNC: 36 U/L (ref 13–60)
LYMPHOCYTES NFR BLD: 2.24 K/UL (ref 1.1–3.7)
LYMPHOCYTES RELATIVE PERCENT: 38 % (ref 25–45)
MCH RBC QN AUTO: 30.6 PG (ref 25.2–33.5)
MCHC RBC AUTO-ENTMCNC: 34.4 G/DL (ref 28.4–34.8)
MCV RBC AUTO: 88.8 FL (ref 82.6–102.9)
MONOCYTES NFR BLD: 0.52 K/UL (ref 0.1–1.4)
MONOCYTES NFR BLD: 9 % (ref 2–8)
NEUTROPHILS NFR BLD: 51 % (ref 34–64)
NEUTS SEG NFR BLD: 2.96 K/UL (ref 1.5–8.1)
NITRITE UR QL STRIP: NEGATIVE
NRBC BLD-RTO: 0 PER 100 WBC
PH UR STRIP: 7 [PH] (ref 5–8)
PLATELET # BLD AUTO: 265 K/UL (ref 138–453)
PMV BLD AUTO: 9.7 FL (ref 8.1–13.5)
POTASSIUM SERPL-SCNC: 3.8 MMOL/L (ref 3.7–5.3)
PROT SERPL-MCNC: 6.5 G/DL (ref 6.6–8.7)
PROT UR STRIP-MCNC: NEGATIVE MG/DL
RBC # BLD AUTO: 4.09 M/UL (ref 3.95–5.11)
RBC #/AREA URNS HPF: ABNORMAL /HPF (ref 0–2)
SODIUM SERPL-SCNC: 138 MMOL/L (ref 136–145)
SP GR UR STRIP: 1.02 (ref 1–1.03)
UROBILINOGEN UR STRIP-ACNC: NORMAL EU/DL (ref 0–1)
WBC #/AREA URNS HPF: ABNORMAL /HPF (ref 0–5)
WBC OTHER # BLD: 5.9 K/UL (ref 4.5–13.5)

## 2025-09-04 PROCEDURE — 81025 URINE PREGNANCY TEST: CPT

## 2025-09-04 PROCEDURE — 85025 COMPLETE CBC W/AUTO DIFF WBC: CPT

## 2025-09-04 PROCEDURE — 6360000002 HC RX W HCPCS

## 2025-09-04 PROCEDURE — 99284 EMERGENCY DEPT VISIT MOD MDM: CPT

## 2025-09-04 PROCEDURE — 6370000000 HC RX 637 (ALT 250 FOR IP)

## 2025-09-04 PROCEDURE — 2580000003 HC RX 258

## 2025-09-04 PROCEDURE — 81001 URINALYSIS AUTO W/SCOPE: CPT

## 2025-09-04 PROCEDURE — 83690 ASSAY OF LIPASE: CPT

## 2025-09-04 PROCEDURE — 96375 TX/PRO/DX INJ NEW DRUG ADDON: CPT

## 2025-09-04 PROCEDURE — 96374 THER/PROPH/DIAG INJ IV PUSH: CPT

## 2025-09-04 PROCEDURE — 80053 COMPREHEN METABOLIC PANEL: CPT

## 2025-09-04 PROCEDURE — 71101 X-RAY EXAM UNILAT RIBS/CHEST: CPT

## 2025-09-04 RX ORDER — KETOROLAC TROMETHAMINE 30 MG/ML
30 INJECTION, SOLUTION INTRAMUSCULAR; INTRAVENOUS ONCE
Status: COMPLETED | OUTPATIENT
Start: 2025-09-04 | End: 2025-09-04

## 2025-09-04 RX ORDER — 0.9 % SODIUM CHLORIDE 0.9 %
1000 INTRAVENOUS SOLUTION INTRAVENOUS ONCE
Status: COMPLETED | OUTPATIENT
Start: 2025-09-04 | End: 2025-09-04

## 2025-09-04 RX ORDER — DIPHENHYDRAMINE HYDROCHLORIDE 50 MG/ML
25 INJECTION, SOLUTION INTRAMUSCULAR; INTRAVENOUS ONCE
Status: COMPLETED | OUTPATIENT
Start: 2025-09-04 | End: 2025-09-04

## 2025-09-04 RX ORDER — CEPHALEXIN 500 MG/1
500 CAPSULE ORAL 2 TIMES DAILY
Qty: 14 CAPSULE | Refills: 0 | Status: SHIPPED | OUTPATIENT
Start: 2025-09-04 | End: 2025-09-11

## 2025-09-04 RX ORDER — CEPHALEXIN 500 MG/1
500 CAPSULE ORAL ONCE
Status: COMPLETED | OUTPATIENT
Start: 2025-09-04 | End: 2025-09-04

## 2025-09-04 RX ORDER — ONDANSETRON 4 MG/1
4 TABLET, ORALLY DISINTEGRATING ORAL 3 TIMES DAILY PRN
Qty: 21 TABLET | Refills: 0 | Status: SHIPPED | OUTPATIENT
Start: 2025-09-04

## 2025-09-04 RX ORDER — ONDANSETRON 2 MG/ML
4 INJECTION INTRAMUSCULAR; INTRAVENOUS ONCE
Status: COMPLETED | OUTPATIENT
Start: 2025-09-04 | End: 2025-09-04

## 2025-09-04 RX ORDER — DEXAMETHASONE SODIUM PHOSPHATE 4 MG/ML
4 INJECTION, SOLUTION INTRA-ARTICULAR; INTRALESIONAL; INTRAMUSCULAR; INTRAVENOUS; SOFT TISSUE ONCE
Status: COMPLETED | OUTPATIENT
Start: 2025-09-04 | End: 2025-09-04

## 2025-09-04 RX ADMIN — DEXAMETHASONE SODIUM PHOSPHATE 4 MG: 4 INJECTION INTRA-ARTICULAR; INTRALESIONAL; INTRAMUSCULAR; INTRAVENOUS; SOFT TISSUE at 22:10

## 2025-09-04 RX ADMIN — CEPHALEXIN 500 MG: 500 CAPSULE ORAL at 23:34

## 2025-09-04 RX ADMIN — KETOROLAC TROMETHAMINE 30 MG: 30 INJECTION, SOLUTION INTRAMUSCULAR at 22:10

## 2025-09-04 RX ADMIN — SODIUM CHLORIDE 1000 ML: 0.9 INJECTION, SOLUTION INTRAVENOUS at 22:08

## 2025-09-04 RX ADMIN — DIPHENHYDRAMINE HYDROCHLORIDE 25 MG: 50 INJECTION INTRAMUSCULAR; INTRAVENOUS at 22:10

## 2025-09-04 RX ADMIN — ONDANSETRON 4 MG: 2 INJECTION, SOLUTION INTRAMUSCULAR; INTRAVENOUS at 22:10

## 2025-09-04 ASSESSMENT — ENCOUNTER SYMPTOMS
CONSTIPATION: 0
SHORTNESS OF BREATH: 0
STRIDOR: 0
CHEST TIGHTNESS: 0
DIARRHEA: 0
VOMITING: 0
NAUSEA: 1
COUGH: 0
WHEEZING: 0
ABDOMINAL PAIN: 0

## 2025-09-04 ASSESSMENT — PAIN - FUNCTIONAL ASSESSMENT
PAIN_FUNCTIONAL_ASSESSMENT: 0-10
PAIN_FUNCTIONAL_ASSESSMENT: 0-10

## 2025-09-04 ASSESSMENT — PAIN SCALES - GENERAL
PAINLEVEL_OUTOF10: 4
PAINLEVEL_OUTOF10: 7
PAINLEVEL_OUTOF10: 7

## (undated) DEVICE — SWAB CULT CLR BLU PLAS RAYON LIQ AMIES AERB ANAERB FRIC CAP

## (undated) DEVICE — GARMENT,MEDLINE,DVT,INT,CALF,MED, GEN2: Brand: MEDLINE

## (undated) DEVICE — SVMMC HD AND NK PK

## (undated) DEVICE — SOLUTION PREP POVIDONE IOD FOR SKIN MUCOUS MEM PRIOR TO

## (undated) DEVICE — TOWEL,OR,DSP,ST,NATURAL,DLX,4/PK,20PK/CS: Brand: MEDLINE

## (undated) DEVICE — COLLECTOR SPEC RAYON LIQ STUART DBL FOR THRT VAG SKIN WND

## (undated) DEVICE — GAUZE,SPONGE,FLUFF,6"X6.75",STRL,5/TRAY: Brand: MEDLINE

## (undated) DEVICE — SPONGE,PEANUT,XRAY,ST,SM,3/8",5/CARD: Brand: MEDLINE INDUSTRIES, INC.

## (undated) DEVICE — TUBING SUCT 10FR MAL ALUM SHFT FN CAP VENT UNIV CONN W/ OBT

## (undated) DEVICE — SOLUTION SCRB 4OZ 10% POVIDONE IOD ANTIMIC BTL

## (undated) DEVICE — POSITIONER HD W8XH4XL8.5IN RASPBERRY FOAM SLT

## (undated) DEVICE — SYRINGE, LUER LOCK, 10ML: Brand: MEDLINE

## (undated) DEVICE — GOWN,AURORA,NONREINFORCED,LARGE: Brand: MEDLINE

## (undated) DEVICE — ELECTRODE ELECSURG NDL 2.8 INX7.2 CM COAT INSUL EDGE

## (undated) DEVICE — DRAPE,THYROID,SOFT,STERILE: Brand: MEDLINE

## (undated) DEVICE — PREMIUM DRY TRAY LF: Brand: MEDLINE INDUSTRIES, INC.

## (undated) DEVICE — DRAIN SURG PENROSE 0.25X18 IN CLOSED WND DRAINAGE PREM SIL

## (undated) DEVICE — INTENDED FOR TISSUE SEPARATION, AND OTHER PROCEDURES THAT REQUIRE A SHARP SURGICAL BLADE TO PUNCTURE OR CUT.: Brand: BARD-PARKER ® CARBON RIB-BACK BLADES

## (undated) DEVICE — ARGYLE FRAZIER SURGICAL SUCTION INSTRUMENT 8 FR/CH (2.7 MM): Brand: ARGYLE

## (undated) DEVICE — SUTURE NONABSORBABLE MONOFILAMENT 3-0 PS-1 18 IN BLK ETHILON 1663H

## (undated) DEVICE — SYRINGE,CONTROL,LL,FINGER,GRIP: Brand: MEDLINE INDUSTRIES, INC.

## (undated) DEVICE — GLOVE SURG SZ 65 THK91MIL LTX FREE SYN POLYISOPRENE

## (undated) DEVICE — SYRINGE MED 10ML TRNSLUC BRL PLUNG BLK MRK POLYPR CTRL